# Patient Record
Sex: MALE | Race: BLACK OR AFRICAN AMERICAN | NOT HISPANIC OR LATINO | Employment: FULL TIME | URBAN - METROPOLITAN AREA
[De-identification: names, ages, dates, MRNs, and addresses within clinical notes are randomized per-mention and may not be internally consistent; named-entity substitution may affect disease eponyms.]

---

## 2021-07-11 ENCOUNTER — HOSPITAL ENCOUNTER (EMERGENCY)
Dept: RADIOLOGY | Facility: CLINIC | Age: 36
End: 2021-07-11
Payer: MEDICAID

## 2021-07-11 ENCOUNTER — HOSPITAL ENCOUNTER (EMERGENCY)
Facility: CLINIC | Age: 36
Discharge: HOME | End: 2021-07-11
Admitting: PHYSICIAN ASSISTANT
Payer: MEDICAID

## 2021-07-11 VITALS
HEIGHT: 72 IN | RESPIRATION RATE: 33 BRPM | DIASTOLIC BLOOD PRESSURE: 101 MMHG | TEMPERATURE: 97.8 F | OXYGEN SATURATION: 97 % | SYSTOLIC BLOOD PRESSURE: 177 MMHG | WEIGHT: 250 LBS | HEART RATE: 94 BPM | BODY MASS INDEX: 33.86 KG/M2

## 2021-07-11 DIAGNOSIS — I50.9 CONGESTIVE HEART FAILURE, UNSPECIFIED HF CHRONICITY, UNSPECIFIED HEART FAILURE TYPE (H): ICD-10-CM

## 2021-07-11 LAB
ALBUMIN SERPL-MCNC: 3.8 G/DL (ref 3.5–5)
ALP SERPL-CCNC: 56 U/L (ref 45–120)
ALT SERPL W P-5'-P-CCNC: 33 U/L (ref 0–45)
ANION GAP SERPL CALCULATED.3IONS-SCNC: 10 MMOL/L (ref 5–18)
AST SERPL W P-5'-P-CCNC: 36 U/L (ref 0–40)
BASOPHILS # BLD AUTO: 0.1 10E3/UL (ref 0–0.2)
BASOPHILS NFR BLD AUTO: 1 %
BILIRUB SERPL-MCNC: 0.4 MG/DL (ref 0–1)
BNP SERPL-MCNC: 579 PG/ML (ref 0–35)
BUN SERPL-MCNC: 17 MG/DL (ref 8–22)
CALCIUM SERPL-MCNC: 9.2 MG/DL (ref 8.5–10.5)
CHLORIDE BLD-SCNC: 109 MMOL/L (ref 98–107)
CO2 SERPL-SCNC: 21 MMOL/L (ref 22–31)
CREAT SERPL-MCNC: 1.29 MG/DL (ref 0.7–1.3)
EOSINOPHIL # BLD AUTO: 0.1 10E3/UL (ref 0–0.7)
EOSINOPHIL NFR BLD AUTO: 2 %
ERYTHROCYTE [DISTWIDTH] IN BLOOD BY AUTOMATED COUNT: 14.6 % (ref 10–15)
GFR SERPL CREATININE-BSD FRML MDRD: 71 ML/MIN/1.73M2
GLUCOSE BLD-MCNC: 95 MG/DL (ref 70–125)
HCT VFR BLD AUTO: 39.7 % (ref 40–53)
HGB BLD-MCNC: 13.1 G/DL (ref 13.3–17.7)
HOLD SPECIMEN: NORMAL
IMM GRANULOCYTES # BLD: 0 10E3/UL
IMM GRANULOCYTES NFR BLD: 0 %
LIPASE SERPL-CCNC: 17 U/L (ref 0–52)
LYMPHOCYTES # BLD AUTO: 2.6 10E3/UL (ref 0.8–5.3)
LYMPHOCYTES NFR BLD AUTO: 47 %
MCH RBC QN AUTO: 29.2 PG (ref 26.5–33)
MCHC RBC AUTO-ENTMCNC: 33 G/DL (ref 31.5–36.5)
MCV RBC AUTO: 88 FL (ref 78–100)
MONOCYTES # BLD AUTO: 0.2 10E3/UL (ref 0–1.3)
MONOCYTES NFR BLD AUTO: 4 %
NEUTROPHILS # BLD AUTO: 2.6 10E3/UL (ref 1.6–8.3)
NEUTROPHILS NFR BLD AUTO: 46 %
NRBC # BLD AUTO: 0 10E3/UL
NRBC BLD AUTO-RTO: 0 /100
PLATELET # BLD AUTO: 158 10E3/UL (ref 150–450)
POTASSIUM BLD-SCNC: 4.4 MMOL/L (ref 3.5–5)
PROT SERPL-MCNC: 7.2 G/DL (ref 6–8)
RBC # BLD AUTO: 4.49 10E6/UL (ref 4.4–5.9)
SODIUM SERPL-SCNC: 140 MMOL/L (ref 136–145)
TROPONIN I SERPL-MCNC: 0.1 NG/ML (ref 0–0.29)
WBC # BLD AUTO: 5.7 10E3/UL (ref 4–11)

## 2021-07-11 PROCEDURE — 84484 ASSAY OF TROPONIN QUANT: CPT | Performed by: PHYSICIAN ASSISTANT

## 2021-07-11 PROCEDURE — 93005 ELECTROCARDIOGRAM TRACING: CPT | Performed by: PHYSICIAN ASSISTANT

## 2021-07-11 PROCEDURE — 71046 X-RAY EXAM CHEST 2 VIEWS: CPT

## 2021-07-11 PROCEDURE — 99285 EMERGENCY DEPT VISIT HI MDM: CPT | Mod: 25

## 2021-07-11 PROCEDURE — 36592 COLLECT BLOOD FROM PICC: CPT | Performed by: PHYSICIAN ASSISTANT

## 2021-07-11 PROCEDURE — 80053 COMPREHEN METABOLIC PANEL: CPT | Performed by: PHYSICIAN ASSISTANT

## 2021-07-11 PROCEDURE — 96374 THER/PROPH/DIAG INJ IV PUSH: CPT

## 2021-07-11 PROCEDURE — 83690 ASSAY OF LIPASE: CPT | Performed by: PHYSICIAN ASSISTANT

## 2021-07-11 PROCEDURE — 83880 ASSAY OF NATRIURETIC PEPTIDE: CPT | Performed by: PHYSICIAN ASSISTANT

## 2021-07-11 PROCEDURE — 250N000011 HC RX IP 250 OP 636: Performed by: PHYSICIAN ASSISTANT

## 2021-07-11 PROCEDURE — 85025 COMPLETE CBC W/AUTO DIFF WBC: CPT | Performed by: PHYSICIAN ASSISTANT

## 2021-07-11 RX ORDER — LISINOPRIL 20 MG/1
20 TABLET ORAL DAILY
Qty: 30 TABLET | Refills: 3 | Status: ON HOLD | OUTPATIENT
Start: 2021-07-11 | End: 2021-12-27

## 2021-07-11 RX ORDER — FUROSEMIDE 10 MG/ML
20 INJECTION INTRAMUSCULAR; INTRAVENOUS ONCE
Status: COMPLETED | OUTPATIENT
Start: 2021-07-11 | End: 2021-07-11

## 2021-07-11 RX ORDER — SPIRONOLACTONE 25 MG/1
25 TABLET ORAL DAILY
Qty: 30 TABLET | Refills: 3 | Status: ON HOLD | OUTPATIENT
Start: 2021-07-11 | End: 2022-01-20

## 2021-07-11 RX ORDER — FUROSEMIDE 40 MG
40 TABLET ORAL DAILY
Qty: 30 TABLET | Refills: 0 | Status: ON HOLD | OUTPATIENT
Start: 2021-07-11 | End: 2021-12-27

## 2021-07-11 RX ORDER — CARVEDILOL 12.5 MG/1
12.5 TABLET ORAL 2 TIMES DAILY WITH MEALS
Qty: 60 TABLET | Refills: 0 | Status: SHIPPED | OUTPATIENT
Start: 2021-07-11 | End: 2022-01-19

## 2021-07-11 RX ADMIN — FUROSEMIDE 20 MG: 10 INJECTION, SOLUTION INTRAMUSCULAR; INTRAVENOUS at 23:20

## 2021-07-11 ASSESSMENT — ENCOUNTER SYMPTOMS
WOUND: 0
COUGH: 0
NAUSEA: 0
HEADACHES: 0
FREQUENCY: 0
BACK PAIN: 0
WEAKNESS: 0
HEMATURIA: 0
SORE THROAT: 0
EYE DISCHARGE: 0
NECK PAIN: 0
DYSURIA: 0
NUMBNESS: 0
ABDOMINAL DISTENTION: 1
DIARRHEA: 1
ABDOMINAL PAIN: 0
FEVER: 0
VOMITING: 0
CHEST TIGHTNESS: 1
SHORTNESS OF BREATH: 1
TROUBLE SWALLOWING: 0
CHILLS: 0

## 2021-07-11 ASSESSMENT — MIFFLIN-ST. JEOR: SCORE: 2101.99

## 2021-07-12 NOTE — ED PROVIDER NOTES
EMERGENCY DEPARTMENT ENCOUNTER      NAME: Cyrus Evans  AGE: 36 year old male  YOB: 1985  MRN: [id number]  EVALUATION DATE & TIME: 7/11/2021  8:38 PM    PCP: No primary care provider on file.    ED PROVIDER: Ayo Juan PA-C      Chief Complaint   Patient presents with     Shortness of Breath         FINAL IMPRESSION:  1. Congestive heart failure, unspecified HF chronicity, unspecified heart failure type (H)          MEDICAL DECISION MAKING:    Pertinent Labs & Imaging studies reviewed. (See chart for details)  36 year old male presents to the Emergency Department for evaluation of ongoing shortness of breath that has been progressive over the last few weeks.  This comes from gentleman with previous history of congestive heart failure who is supposed to be on 2 blood pressure medications and to diuretics but is been off of them for approximately 2 months.    Patient does not appear in acute respiratory distress, he does not appear toxic.  He is not hypoxic.  The screening x-ray and BNP do confirm mild CHF.  I have given a dose of IV Lasix here and I will refill all of his typical prescriptions that he has been using.  I also placed an outpatient referral for family practice and cardiology follow-up.  If he develops worsening or new symptoms he should return to the ED for repeat assessment otherwise patient is comfortable with discharging home and outpatient follow-up.        ED COURSE  8:44 PM  I met with the patient, obtained history, performed an initial exam, and discussed options and plan for diagnostics and treatment here in the ED.    At the conclusion of the encounter I discussed the results of all of the tests and the disposition. The questions were answered. The patient or family acknowledged understanding and was agreeable with the care plan.     MEDICATIONS GIVEN IN THE EMERGENCY:  Medications   furosemide (LASIX) injection 20 mg (20 mg Intravenous Given 7/11/21 2330)       NEW  PRESCRIPTIONS STARTED AT TODAY'S ER VISIT  New Prescriptions    CARVEDILOL (COREG) 12.5 MG TABLET    Take 1 tablet (12.5 mg) by mouth 2 times daily (with meals)    FUROSEMIDE (LASIX) 40 MG TABLET    Take 1 tablet (40 mg) by mouth daily    LISINOPRIL (ZESTRIL) 20 MG TABLET    Take 1 tablet (20 mg) by mouth daily    SPIRONOLACTONE (ALDACTONE) 25 MG TABLET    Take 1 tablet (25 mg) by mouth daily            =================================================================    HPI    Patient information was obtained from: patient    Use of Interpretor: N/A       Cyrus Evans is a 36 year old male with a pertinent history of HTN, CHF, alcohol abuse, current smoker who presents to this ED by walk in for evaluation of shortness of breath.    The patient states that he has had shortness of breath for the past month or so that worsened today. States it is worse with laying down flat and with activity, and at rest he feels okay. He gets some chest tightness when his shortness of breath gets bad, but otherwise denies chest pain. In addition he feels like his belly is bloated and swollen, and he has had diarrhea for about a week and a half. He denies any vomiting or any other symptoms. He reports smoking 0.5 pack of cigarettes/day. He reports taking lisinopril, Furosamide, spironolactone, and carvedilol. He denies history of DM or heart attacks.    His fiance states he that he recently moved here from [place] and has not set up with a PCP here yet. She states he's been off his medications for a month or two now. Patient confirms this.        REVIEW OF SYSTEMS   Review of Systems   Constitutional: Negative for chills and fever.   HENT: Negative for congestion, sore throat and trouble swallowing.    Eyes: Negative for discharge and visual disturbance.   Respiratory: Positive for chest tightness (with severe shortness of breath only) and shortness of breath. Negative for cough.    Cardiovascular: Negative for chest pain and  leg swelling.   Gastrointestinal: Positive for abdominal distention and diarrhea. Negative for abdominal pain, nausea and vomiting.   Genitourinary: Negative for dysuria, frequency, hematuria and urgency.   Musculoskeletal: Negative for back pain, gait problem and neck pain.   Skin: Negative for rash and wound.   Neurological: Negative for weakness, numbness and headaches.   Psychiatric/Behavioral: Negative for behavioral problems and suicidal ideas.          PAST MEDICAL HISTORY:  No past medical history on file.    PAST SURGICAL HISTORY:  No past surgical history on file.      CURRENT MEDICATIONS:    No current facility-administered medications for this encounter.    Current Outpatient Medications:      carvedilol (COREG) 12.5 MG tablet, Take 1 tablet (12.5 mg) by mouth 2 times daily (with meals), Disp: 60 tablet, Rfl: 0     furosemide (LASIX) 40 MG tablet, Take 1 tablet (40 mg) by mouth daily, Disp: 30 tablet, Rfl: 0     lisinopril (ZESTRIL) 20 MG tablet, Take 1 tablet (20 mg) by mouth daily, Disp: 30 tablet, Rfl: 3     spironolactone (ALDACTONE) 25 MG tablet, Take 1 tablet (25 mg) by mouth daily, Disp: 30 tablet, Rfl: 3     HYDROcodone-acetaminophen 5-325 mg per tablet, [HYDROCODONE-ACETAMINOPHEN 5-325 MG PER TABLET] Take 1 tablet by mouth every 4 (four) hours as needed for pain., Disp: 12 tablet, Rfl: 0      ALLERGIES:  No Known Allergies    FAMILY HISTORY:  No family history on file.    SOCIAL HISTORY:   Social History     Socioeconomic History     Marital status: Single     Spouse name: Not on file     Number of children: Not on file     Years of education: Not on file     Highest education level: Not on file   Occupational History     Not on file   Tobacco Use     Smoking status: Former Smoker   Substance and Sexual Activity     Alcohol use: Not on file     Comment: Alcoholic Drinks/day: Former     Drug use: Not on file     Sexual activity: Not on file   Other Topics Concern     Not on file   Social History  Narrative     Not on file     Social Determinants of Health     Financial Resource Strain:      Difficulty of Paying Living Expenses:    Food Insecurity:      Worried About Running Out of Food in the Last Year:      Ran Out of Food in the Last Year:    Transportation Needs:      Lack of Transportation (Medical):      Lack of Transportation (Non-Medical):    Physical Activity:      Days of Exercise per Week:      Minutes of Exercise per Session:    Stress:      Feeling of Stress :    Social Connections:      Frequency of Communication with Friends and Family:      Frequency of Social Gatherings with Friends and Family:      Attends Hindu Services:      Active Member of Clubs or Organizations:      Attends Club or Organization Meetings:      Marital Status:    Intimate Partner Violence:      Fear of Current or Ex-Partner:      Emotionally Abused:      Physically Abused:      Sexually Abused:        VITALS:  Patient Vitals for the past 24 hrs:   BP Temp Temp src Pulse Resp SpO2 Height Weight   07/11/21 2300 (!) 177/101 -- -- 94 (!) 33 97 % -- --   07/11/21 2200 (!) 166/109 -- -- 95 11 95 % -- --   07/11/21 2145 -- -- -- 100 20 94 % -- --   07/11/21 2130 (!) 200/130 -- -- 94 (!) 31 97 % -- --   07/11/21 2100 (!) 172/114 -- -- 95 30 98 % -- --   07/11/21 2045 (!) 161/111 -- -- 99 -- 100 % -- --   07/11/21 2033 -- 97.8  F (36.6  C) Oral 93 22 100 % 1.829 m (6') 113.4 kg (250 lb)       PHYSICAL EXAM    Physical Exam  Vitals and nursing note reviewed.   Constitutional:       General: He is not in acute distress.     Appearance: Normal appearance. He is obese. He is not ill-appearing or toxic-appearing.   HENT:      Head: Normocephalic and atraumatic.      Right Ear: Tympanic membrane, ear canal and external ear normal.      Left Ear: Tympanic membrane, ear canal and external ear normal.      Nose: Nose normal.      Mouth/Throat:      Mouth: Mucous membranes are moist.      Pharynx: Oropharynx is clear. No oropharyngeal  exudate or posterior oropharyngeal erythema.   Eyes:      General: No scleral icterus.        Right eye: No discharge.         Left eye: No discharge.      Extraocular Movements: Extraocular movements intact.      Conjunctiva/sclera: Conjunctivae normal.   Cardiovascular:      Rate and Rhythm: Normal rate and regular rhythm.      Pulses: Normal pulses.      Heart sounds: Normal heart sounds. No murmur heard.     Pulmonary:      Effort: Pulmonary effort is normal. No respiratory distress.      Breath sounds: Normal breath sounds. No stridor. No wheezing, rhonchi or rales.   Chest:      Chest wall: No tenderness.   Abdominal:      General: Abdomen is flat. Bowel sounds are normal. There is no distension.      Palpations: Abdomen is soft.      Tenderness: There is no abdominal tenderness. There is no guarding or rebound.   Musculoskeletal:         General: No swelling, tenderness, deformity or signs of injury. Normal range of motion.      Cervical back: Normal range of motion and neck supple. No rigidity or tenderness.      Right lower leg: No tenderness. Edema present.      Left lower leg: No tenderness. Edema present.   Lymphadenopathy:      Cervical: No cervical adenopathy.   Skin:     General: Skin is warm and dry.      Coloration: Skin is not jaundiced.      Findings: No bruising, erythema or rash.   Neurological:      General: No focal deficit present.      Mental Status: He is alert and oriented to person, place, and time. Mental status is at baseline.      Cranial Nerves: No cranial nerve deficit.      Sensory: No sensory deficit.      Motor: No weakness.      Gait: Gait normal.   Psychiatric:         Mood and Affect: Mood normal.         Behavior: Behavior normal.         Judgment: Judgment normal.          LAB:  All pertinent labs reviewed and interpreted.  Results for orders placed or performed during the hospital encounter of 07/11/21   XR Chest 2 Views    Impression    IMPRESSION: The cardiac silhouette is  enlarged. There is mild prominence of the central and basilar interstitium which could reflect mild fluid overload. No visible pneumothorax or pleural effusion.   Comprehensive metabolic panel   Result Value Ref Range    Sodium 140 136 - 145 mmol/L    Potassium 4.4 3.5 - 5.0 mmol/L    Chloride 109 (H) 98 - 107 mmol/L    Carbon Dioxide (CO2) 21 (L) 22 - 31 mmol/L    Anion Gap 10 5 - 18 mmol/L    Urea Nitrogen 17 8 - 22 mg/dL    Creatinine 1.29 0.70 - 1.30 mg/dL    Calcium 9.2 8.5 - 10.5 mg/dL    Glucose 95 70 - 125 mg/dL    Alkaline Phosphatase 56 45 - 120 U/L    AST 36 0 - 40 U/L    ALT 33 0 - 45 U/L    Protein Total 7.2 6.0 - 8.0 g/dL    Albumin 3.8 3.5 - 5.0 g/dL    Bilirubin Total 0.4 0.0 - 1.0 mg/dL    GFR Estimate 71 >60 mL/min/1.73m2   Result Value Ref Range    Lipase 17 0 - 52 U/L   Result Value Ref Range    Troponin I 0.10 0.00 - 0.29 ng/mL   B-Type Natriuretic Peptide ([facility] Only)   Result Value Ref Range     (H) 0 - 35 pg/mL   CBC with platelets and differential   Result Value Ref Range    WBC Count 5.7 4.0 - 11.0 10e3/uL    RBC Count 4.49 4.40 - 5.90 10e6/uL    Hemoglobin 13.1 (L) 13.3 - 17.7 g/dL    Hematocrit 39.7 (L) 40.0 - 53.0 %    MCV 88 78 - 100 fL    MCH 29.2 26.5 - 33.0 pg    MCHC 33.0 31.5 - 36.5 g/dL    RDW 14.6 10.0 - 15.0 %    Platelet Count 158 150 - 450 10e3/uL    % Neutrophils 46 %    % Lymphocytes 47 %    % Monocytes 4 %    % Eosinophils 2 %    % Basophils 1 %    % Immature Granulocytes 0 %    NRBCs per 100 WBC 0 <1 /100    Absolute Neutrophils 2.6 1.6 - 8.3 10e3/uL    Absolute Lymphocytes 2.6 0.8 - 5.3 10e3/uL    Absolute Monocytes 0.2 0.0 - 1.3 10e3/uL    Absolute Eosinophils 0.1 0.0 - 0.7 10e3/uL    Absolute Basophils 0.1 0.0 - 0.2 10e3/uL    Absolute Immature Granulocytes 0.0 <=0.0 10e3/uL    Absolute NRBCs 0.0 10e3/uL   Extra Purple Top Tube   Result Value Ref Range    Hold Specimen [facility]        RADIOLOGY:  Reviewed all pertinent imaging. Please see official radiology  report.  XR Chest 2 Views   Final Result   IMPRESSION: The cardiac silhouette is enlarged. There is mild prominence of the central and basilar interstitium which could reflect mild fluid overload. No visible pneumothorax or pleural effusion.          EKG:    Performed at: 2056    EKG showing sinus rhythm at a rate of 89 bpm.  ST segments are grossly normal with no obvious elevation or depression.  Biphasic T waves in leads V5 and V6 otherwise T waves are upright.  QTC measured at 498.  No previous EKG for comparison at this time.  No current complaints of chest pain, patient is only short of breath.    I have independently reviewed and interpreted the EKG(s) documented above.            I, Atif Whitley, am serving as a scribe to document services personally performed by Ayo Juan PA-C based on my observation and the provider's statements to me. I, Ayo Juan PA-C attest that Atif Whitley is acting in a scribe capacity, has observed my performance of the services and has documented them in accordance with my direction.    Ayo Juan PA-C  Emergency Medicine  [facility]     Ayo Juan PA-C  07/11/21 4266

## 2021-07-12 NOTE — DISCHARGE INSTRUCTIONS
Please continue to take your medications as prescribed.  I have placed referrals as an outpatient to establish family practice as an outpatient as well as outpatient cardiology.  If you have worsening symptoms consider returning to the emergency department.  I do believe that all of your symptoms will resolve once you resume your normal medications that you have been off of for the last couple of months.

## 2021-07-12 NOTE — ED TRIAGE NOTES
"Presents with complaint of shortness of breath and \"bloating\" for the past week. This worsens with laying flat and activity. Hx of CHF and states feels like an exacerbation.  "

## 2021-07-13 LAB
ATRIAL RATE - MUSE: 89 BPM
DIASTOLIC BLOOD PRESSURE - MUSE: 111 MMHG
INTERPRETATION ECG - MUSE: NORMAL
P AXIS - MUSE: 39 DEGREES
PR INTERVAL - MUSE: 162 MS
QRS DURATION - MUSE: 112 MS
QT - MUSE: 410 MS
QTC - MUSE: 498 MS
R AXIS - MUSE: -57 DEGREES
SYSTOLIC BLOOD PRESSURE - MUSE: 161 MMHG
T AXIS - MUSE: 80 DEGREES
VENTRICULAR RATE- MUSE: 89 BPM

## 2021-12-26 ENCOUNTER — APPOINTMENT (OUTPATIENT)
Dept: CT IMAGING | Facility: CLINIC | Age: 36
End: 2021-12-26

## 2021-12-26 ENCOUNTER — HOSPITAL ENCOUNTER (OUTPATIENT)
Facility: CLINIC | Age: 36
Setting detail: OBSERVATION
Discharge: HOME | End: 2021-12-27
Attending: EMERGENCY MEDICINE | Admitting: INTERNAL MEDICINE

## 2021-12-26 DIAGNOSIS — I10 ACCELERATED ESSENTIAL HYPERTENSION: ICD-10-CM

## 2021-12-26 DIAGNOSIS — I50.23 ACUTE ON CHRONIC SYSTOLIC CONGESTIVE HEART FAILURE (H): Primary | ICD-10-CM

## 2021-12-26 DIAGNOSIS — I50.9 ACUTE ON CHRONIC CONGESTIVE HEART FAILURE, UNSPECIFIED HEART FAILURE TYPE (H): ICD-10-CM

## 2021-12-26 PROBLEM — R79.89 ELEVATED TROPONIN: Status: ACTIVE | Noted: 2021-11-20

## 2021-12-26 PROBLEM — Z87.891 FORMER CIGARETTE SMOKER: Status: ACTIVE | Noted: 2021-12-26

## 2021-12-26 PROBLEM — I50.20 HFREF (HEART FAILURE WITH REDUCED EJECTION FRACTION) (H): Status: ACTIVE | Noted: 2021-11-20

## 2021-12-26 PROBLEM — N18.30 CKD (CHRONIC KIDNEY DISEASE), STAGE III (H): Status: ACTIVE | Noted: 2021-11-20

## 2021-12-26 PROBLEM — E66.811 OBESITY (BMI 30.0-34.9): Status: ACTIVE | Noted: 2021-12-26

## 2021-12-26 PROBLEM — F10.10 ALCOHOL ABUSE: Status: ACTIVE | Noted: 2018-04-03

## 2021-12-26 PROBLEM — R79.89 SERUM CREATININE RAISED: Status: ACTIVE | Noted: 2018-04-11

## 2021-12-26 LAB
ALBUMIN SERPL-MCNC: 3.4 G/DL (ref 3.5–5)
ALP SERPL-CCNC: 72 U/L (ref 45–120)
ALT SERPL W P-5'-P-CCNC: 30 U/L (ref 0–45)
ANION GAP SERPL CALCULATED.3IONS-SCNC: 7 MMOL/L (ref 5–18)
AST SERPL W P-5'-P-CCNC: 23 U/L (ref 0–40)
BASOPHILS # BLD AUTO: 0 10E3/UL (ref 0–0.2)
BASOPHILS NFR BLD AUTO: 1 %
BILIRUB SERPL-MCNC: 0.5 MG/DL (ref 0–1)
BNP SERPL-MCNC: 2065 PG/ML (ref 0–35)
BUN SERPL-MCNC: 18 MG/DL (ref 8–22)
CALCIUM SERPL-MCNC: 8.8 MG/DL (ref 8.5–10.5)
CHLORIDE BLD-SCNC: 105 MMOL/L (ref 98–107)
CO2 SERPL-SCNC: 26 MMOL/L (ref 22–31)
CREAT SERPL-MCNC: 1.23 MG/DL (ref 0.7–1.3)
D DIMER PPP FEU-MCNC: 3.77 UG/ML FEU (ref 0–0.5)
EOSINOPHIL # BLD AUTO: 0.1 10E3/UL (ref 0–0.7)
EOSINOPHIL NFR BLD AUTO: 1 %
ERYTHROCYTE [DISTWIDTH] IN BLOOD BY AUTOMATED COUNT: 14.1 % (ref 10–15)
FLUAV RNA SPEC QL NAA+PROBE: NEGATIVE
FLUBV RNA RESP QL NAA+PROBE: NEGATIVE
GFR SERPL CREATININE-BSD FRML MDRD: 78 ML/MIN/1.73M2
GLUCOSE BLD-MCNC: 109 MG/DL (ref 70–125)
HCT VFR BLD AUTO: 41.7 % (ref 40–53)
HGB BLD-MCNC: 13.6 G/DL (ref 13.3–17.7)
IMM GRANULOCYTES # BLD: 0 10E3/UL
IMM GRANULOCYTES NFR BLD: 0 %
LYMPHOCYTES # BLD AUTO: 2.2 10E3/UL (ref 0.8–5.3)
LYMPHOCYTES NFR BLD AUTO: 44 %
MAGNESIUM SERPL-MCNC: 1.8 MG/DL (ref 1.8–2.6)
MAGNESIUM SERPL-MCNC: 1.9 MG/DL (ref 1.8–2.6)
MCH RBC QN AUTO: 29.7 PG (ref 26.5–33)
MCHC RBC AUTO-ENTMCNC: 32.6 G/DL (ref 31.5–36.5)
MCV RBC AUTO: 91 FL (ref 78–100)
MONOCYTES # BLD AUTO: 0.2 10E3/UL (ref 0–1.3)
MONOCYTES NFR BLD AUTO: 5 %
NEUTROPHILS # BLD AUTO: 2.5 10E3/UL (ref 1.6–8.3)
NEUTROPHILS NFR BLD AUTO: 49 %
NRBC # BLD AUTO: 0 10E3/UL
NRBC BLD AUTO-RTO: 0 /100
PLATELET # BLD AUTO: 176 10E3/UL (ref 150–450)
POTASSIUM BLD-SCNC: 4.1 MMOL/L (ref 3.5–5)
POTASSIUM BLD-SCNC: 4.3 MMOL/L (ref 3.5–5)
PROT SERPL-MCNC: 6.8 G/DL (ref 6–8)
RBC # BLD AUTO: 4.58 10E6/UL (ref 4.4–5.9)
SARS-COV-2 RNA RESP QL NAA+PROBE: NEGATIVE
SODIUM SERPL-SCNC: 138 MMOL/L (ref 136–145)
TROPONIN I SERPL-MCNC: 0.07 NG/ML (ref 0–0.29)
WBC # BLD AUTO: 5 10E3/UL (ref 4–11)

## 2021-12-26 PROCEDURE — G0378 HOSPITAL OBSERVATION PER HR: HCPCS

## 2021-12-26 PROCEDURE — 250N000011 HC RX IP 250 OP 636: Performed by: INTERNAL MEDICINE

## 2021-12-26 PROCEDURE — 85379 FIBRIN DEGRADATION QUANT: CPT | Performed by: PHYSICIAN ASSISTANT

## 2021-12-26 PROCEDURE — 99285 EMERGENCY DEPT VISIT HI MDM: CPT | Mod: 25

## 2021-12-26 PROCEDURE — 36415 COLL VENOUS BLD VENIPUNCTURE: CPT | Performed by: INTERNAL MEDICINE

## 2021-12-26 PROCEDURE — 250N000011 HC RX IP 250 OP 636: Performed by: PHYSICIAN ASSISTANT

## 2021-12-26 PROCEDURE — C9803 HOPD COVID-19 SPEC COLLECT: HCPCS

## 2021-12-26 PROCEDURE — 83735 ASSAY OF MAGNESIUM: CPT | Performed by: PHYSICIAN ASSISTANT

## 2021-12-26 PROCEDURE — 96376 TX/PRO/DX INJ SAME DRUG ADON: CPT

## 2021-12-26 PROCEDURE — 84132 ASSAY OF SERUM POTASSIUM: CPT | Performed by: INTERNAL MEDICINE

## 2021-12-26 PROCEDURE — 250N000013 HC RX MED GY IP 250 OP 250 PS 637: Performed by: INTERNAL MEDICINE

## 2021-12-26 PROCEDURE — 36415 COLL VENOUS BLD VENIPUNCTURE: CPT | Performed by: PHYSICIAN ASSISTANT

## 2021-12-26 PROCEDURE — 96375 TX/PRO/DX INJ NEW DRUG ADDON: CPT

## 2021-12-26 PROCEDURE — 83880 ASSAY OF NATRIURETIC PEPTIDE: CPT | Performed by: PHYSICIAN ASSISTANT

## 2021-12-26 PROCEDURE — 96374 THER/PROPH/DIAG INJ IV PUSH: CPT

## 2021-12-26 PROCEDURE — 83735 ASSAY OF MAGNESIUM: CPT | Performed by: INTERNAL MEDICINE

## 2021-12-26 PROCEDURE — 93005 ELECTROCARDIOGRAM TRACING: CPT | Performed by: EMERGENCY MEDICINE

## 2021-12-26 PROCEDURE — 99220 PR INITIAL OBSERVATION CARE,LEVEL III: CPT | Performed by: INTERNAL MEDICINE

## 2021-12-26 PROCEDURE — 80053 COMPREHEN METABOLIC PANEL: CPT | Performed by: PHYSICIAN ASSISTANT

## 2021-12-26 PROCEDURE — 71275 CT ANGIOGRAPHY CHEST: CPT

## 2021-12-26 PROCEDURE — 87636 SARSCOV2 & INF A&B AMP PRB: CPT | Performed by: PHYSICIAN ASSISTANT

## 2021-12-26 PROCEDURE — 84484 ASSAY OF TROPONIN QUANT: CPT | Performed by: PHYSICIAN ASSISTANT

## 2021-12-26 PROCEDURE — 85025 COMPLETE CBC W/AUTO DIFF WBC: CPT | Performed by: PHYSICIAN ASSISTANT

## 2021-12-26 PROCEDURE — 250N000013 HC RX MED GY IP 250 OP 250 PS 637: Performed by: PHYSICIAN ASSISTANT

## 2021-12-26 RX ORDER — ACETAMINOPHEN 650 MG/1
650 SUPPOSITORY RECTAL EVERY 6 HOURS PRN
Status: DISCONTINUED | OUTPATIENT
Start: 2021-12-26 | End: 2021-12-27 | Stop reason: HOSPADM

## 2021-12-26 RX ORDER — NITROGLYCERIN 0.4 MG/1
0.4 TABLET SUBLINGUAL EVERY 5 MIN PRN
Status: DISCONTINUED | OUTPATIENT
Start: 2021-12-26 | End: 2021-12-27 | Stop reason: HOSPADM

## 2021-12-26 RX ORDER — HYDRALAZINE HYDROCHLORIDE 20 MG/ML
10 INJECTION INTRAMUSCULAR; INTRAVENOUS EVERY 4 HOURS PRN
Status: DISCONTINUED | OUTPATIENT
Start: 2021-12-26 | End: 2021-12-27 | Stop reason: HOSPADM

## 2021-12-26 RX ORDER — DIPHENHYDRAMINE HYDROCHLORIDE 50 MG/ML
25 INJECTION INTRAMUSCULAR; INTRAVENOUS ONCE
Status: COMPLETED | OUTPATIENT
Start: 2021-12-26 | End: 2021-12-26

## 2021-12-26 RX ORDER — FUROSEMIDE 10 MG/ML
60 INJECTION INTRAMUSCULAR; INTRAVENOUS ONCE
Status: COMPLETED | OUTPATIENT
Start: 2021-12-26 | End: 2021-12-26

## 2021-12-26 RX ORDER — CARVEDILOL 12.5 MG/1
12.5 TABLET ORAL 2 TIMES DAILY WITH MEALS
Status: DISCONTINUED | OUTPATIENT
Start: 2021-12-26 | End: 2021-12-27 | Stop reason: HOSPADM

## 2021-12-26 RX ORDER — NICOTINE 21 MG/24HR
1 PATCH, TRANSDERMAL 24 HOURS TRANSDERMAL DAILY
Status: DISCONTINUED | OUTPATIENT
Start: 2021-12-26 | End: 2021-12-27 | Stop reason: HOSPADM

## 2021-12-26 RX ORDER — AMOXICILLIN 250 MG
2 CAPSULE ORAL 2 TIMES DAILY PRN
Status: DISCONTINUED | OUTPATIENT
Start: 2021-12-26 | End: 2021-12-27 | Stop reason: HOSPADM

## 2021-12-26 RX ORDER — ACETAMINOPHEN 325 MG/1
650 TABLET ORAL EVERY 6 HOURS PRN
Status: DISCONTINUED | OUTPATIENT
Start: 2021-12-26 | End: 2021-12-27 | Stop reason: HOSPADM

## 2021-12-26 RX ORDER — IOPAMIDOL 755 MG/ML
100 INJECTION, SOLUTION INTRAVASCULAR ONCE
Status: COMPLETED | OUTPATIENT
Start: 2021-12-26 | End: 2021-12-26

## 2021-12-26 RX ORDER — NICOTINE 21 MG/24HR
1 PATCH, TRANSDERMAL 24 HOURS TRANSDERMAL DAILY
Status: DISCONTINUED | OUTPATIENT
Start: 2021-12-27 | End: 2021-12-26

## 2021-12-26 RX ORDER — MAGNESIUM OXIDE 400 MG/1
400 TABLET ORAL 2 TIMES DAILY
Status: DISCONTINUED | OUTPATIENT
Start: 2021-12-26 | End: 2021-12-27 | Stop reason: HOSPADM

## 2021-12-26 RX ORDER — LISINOPRIL 20 MG/1
20 TABLET ORAL DAILY
Status: DISCONTINUED | OUTPATIENT
Start: 2021-12-27 | End: 2021-12-27

## 2021-12-26 RX ORDER — POLYETHYLENE GLYCOL 3350 17 G/17G
17 POWDER, FOR SOLUTION ORAL DAILY PRN
Status: DISCONTINUED | OUTPATIENT
Start: 2021-12-26 | End: 2021-12-27 | Stop reason: HOSPADM

## 2021-12-26 RX ORDER — LIDOCAINE 40 MG/G
CREAM TOPICAL
Status: DISCONTINUED | OUTPATIENT
Start: 2021-12-26 | End: 2021-12-27 | Stop reason: HOSPADM

## 2021-12-26 RX ORDER — AMOXICILLIN 250 MG
1 CAPSULE ORAL 2 TIMES DAILY PRN
Status: DISCONTINUED | OUTPATIENT
Start: 2021-12-26 | End: 2021-12-27 | Stop reason: HOSPADM

## 2021-12-26 RX ORDER — ONDANSETRON 4 MG/1
4 TABLET, ORALLY DISINTEGRATING ORAL EVERY 6 HOURS PRN
Status: DISCONTINUED | OUTPATIENT
Start: 2021-12-26 | End: 2021-12-27 | Stop reason: HOSPADM

## 2021-12-26 RX ORDER — ONDANSETRON 2 MG/ML
4 INJECTION INTRAMUSCULAR; INTRAVENOUS EVERY 6 HOURS PRN
Status: DISCONTINUED | OUTPATIENT
Start: 2021-12-26 | End: 2021-12-27 | Stop reason: HOSPADM

## 2021-12-26 RX ORDER — FUROSEMIDE 10 MG/ML
60 INJECTION INTRAMUSCULAR; INTRAVENOUS EVERY 8 HOURS
Status: DISCONTINUED | OUTPATIENT
Start: 2021-12-26 | End: 2021-12-27

## 2021-12-26 RX ADMIN — FUROSEMIDE 60 MG: 10 INJECTION, SOLUTION INTRAMUSCULAR; INTRAVENOUS at 20:57

## 2021-12-26 RX ADMIN — NICOTINE 1 PATCH: 14 PATCH, EXTENDED RELEASE TRANSDERMAL at 23:06

## 2021-12-26 RX ADMIN — IOPAMIDOL 100 ML: 755 INJECTION, SOLUTION INTRAVENOUS at 18:23

## 2021-12-26 RX ADMIN — CARVEDILOL 12.5 MG: 12.5 TABLET, FILM COATED ORAL at 23:05

## 2021-12-26 RX ADMIN — FUROSEMIDE 60 MG: 10 INJECTION, SOLUTION INTRAMUSCULAR; INTRAVENOUS at 19:10

## 2021-12-26 RX ADMIN — DIPHENHYDRAMINE HYDROCHLORIDE 25 MG: 50 INJECTION INTRAMUSCULAR; INTRAVENOUS at 18:39

## 2021-12-26 RX ADMIN — MAGNESIUM OXIDE TAB 400 MG (241.3 MG ELEMENTAL MG) 400 MG: 400 (241.3 MG) TAB at 23:40

## 2021-12-26 RX ADMIN — NITROGLYCERIN 0.4 MG: 0.4 TABLET SUBLINGUAL at 19:14

## 2021-12-26 ASSESSMENT — ENCOUNTER SYMPTOMS
NAUSEA: 0
HEMATURIA: 0
UNEXPECTED WEIGHT CHANGE: 0
DIFFICULTY URINATING: 0
SHORTNESS OF BREATH: 1
VOMITING: 0
BLOOD IN STOOL: 0
SORE THROAT: 0
DIZZINESS: 0
COUGH: 0
DYSURIA: 0
DIARRHEA: 0
FEVER: 0
CHILLS: 0
HEADACHES: 0
BRUISES/BLEEDS EASILY: 0
LIGHT-HEADEDNESS: 0
ABDOMINAL PAIN: 0
WEAKNESS: 0

## 2021-12-26 ASSESSMENT — MIFFLIN-ST. JEOR: SCORE: 2082.94

## 2021-12-26 NOTE — ED PROVIDER NOTES
EMERGENCY DEPARTMENT ENCOUNTER      NAME: Cyrus Evans  AGE: 36 year old male  YOB: 1985  MRN: [id number]  EVALUATION DATE & TIME: 12/26/2021  4:54 PM    PCP: No Ref-Primary, Physician    ED PROVIDER: Catrachita Serna PA-C      Chief Complaint   Patient presents with     Shortness of Breath         FINAL IMPRESSION:  1. Acute on chronic congestive heart failure, unspecified heart failure type (H)          MEDICAL DECISION MAKING:    Pertinent Labs & Imaging studies reviewed. (See chart for details)  36 year old male with a pertinent history of HTN, CHF, CKDIII, alcohol abuse presents to the Emergency Department for evaluation of progressively worsening shortness of breath worse with lying flat and walking. Occasional nonexertional chest pain. Admitted 1 month ago at [facility]. EF 15-20%, BNP 1205, trop elevated. Patient left the followed day AMA.     Vitals reviewed and notable for elevated blood pressure and tachypnea. Saturating mid 90s on room air. Speaking full sentences easily. Diminished breath sounds bilaterally. No wheezing or crackles. No cough. No peripheral edema. Symmetric peripheral pulses. Abdomen soft and non-tender. Differential diagnosis includes but not limited to ACS, PE, pleural effusion, CHF exacerbation, pneumonia, viral URI including COVID-19.     EKG with new PVCs and fusion complexes. No acute ST elevations or depressions. Troponin WNL. D-dimer 3.77. BNP 2065, baseline elevated however now worse. Proceeded with CTA. No evidence for pulmonary emboli. Improved bilateral groundglass opacities. Cardiomegaly with interstitial densities/edema both lungs, pleural effusions, and reflux of contrast into the IVC and hepatic veins indicating right heart failure. No leukocytosis or anemia. Renal function WNL. No electrolyte derangements. COVID and influenza negative. Patient given nitroglycerin SL and 60 mg IV lasix. Shortly after returning from CT he noted his lips felt itchy, benadryl given  with resolution of symptoms. No respiratory distress, angioedema or hives. Patient will be admitted to hospitalist for acute on chronic CHF exacerbation further evaluation and treatment.     0 minutes of critical care time     ED COURSE:  5:06 PM I met with the patient, wearing protective eyewear and N95 mask, obtained history, performed an initial exam, and discussed options and plan for diagnostics and treatment here in the ED.  6:24 PM I staffed patient with Dr. Waldron.  7:04 PM I spoke with Dr. Black, the Hospitalist who accepts patient for admission.     At the conclusion of the encounter I discussed the results of all of the tests and the indication for admission. The questions were answered. The patient acknowledged understanding and was agreeable with the care plan.     MEDICATIONS GIVEN IN THE EMERGENCY:  Medications   nitroGLYcerin (NITROSTAT) sublingual tablet 0.4 mg (0.4 mg Sublingual Given 12/26/21 1914)   carvedilol (COREG) tablet 12.5 mg (has no administration in time range)   lisinopril (ZESTRIL) tablet 20 mg (has no administration in time range)   lidocaine 1 % 0.1-1 mL (has no administration in time range)   lidocaine (LMX4) cream (has no administration in time range)   sodium chloride (PF) 0.9% PF flush 3 mL (has no administration in time range)   sodium chloride (PF) 0.9% PF flush 3 mL (has no administration in time range)   melatonin tablet 1 mg (has no administration in time range)   HOLD: nitroGLYcerin IF (has no administration in time range)   acetaminophen (TYLENOL) tablet 650 mg (has no administration in time range)     Or   acetaminophen (TYLENOL) Suppository 650 mg (has no administration in time range)   senna-docusate (SENOKOT-S/PERICOLACE) 8.6-50 MG per tablet 1 tablet (has no administration in time range)     Or   senna-docusate (SENOKOT-S/PERICOLACE) 8.6-50 MG per tablet 2 tablet (has no administration in time range)   polyethylene glycol (MIRALAX) Packet 17 g (has no administration  in time range)   ondansetron (ZOFRAN-ODT) ODT tab 4 mg (has no administration in time range)     Or   ondansetron (ZOFRAN) injection 4 mg (has no administration in time range)   nicotine Patch in Place (has no administration in time range)   nicotine (NICODERM CQ) 14 MG/24HR 24 hr patch 1 patch (has no administration in time range)   furosemide (LASIX) injection 60 mg (has no administration in time range)   hydrALAZINE (APRESOLINE) injection 10 mg (has no administration in time range)   iopamidol (ISOVUE-370) solution 100 mL (100 mLs Intravenous Given 12/26/21 1823)   diphenhydrAMINE (BENADRYL) injection 25 mg (25 mg Intravenous Given 12/26/21 1839)   furosemide (LASIX) injection 60 mg (60 mg Intravenous Given 12/26/21 1910)       NEW PRESCRIPTIONS STARTED AT TODAY'S ER VISIT  New Prescriptions    No medications on file            =================================================================    HPI:    Patient information was obtained from: patient    Use of Interpretor: N/A       Cyrus Evans is a 36 year old male with a pertinent history of HTN, CHF, CKDIII, alcohol abuse who presents to this ED from home for evaluation of shortness of breath.     Per EMR, patient was hospitalized at [facility] on 11/20 for chest pain. CTA with groundglass opacities in upper and lower lobes concerning for pneumonia, septal thickening/edema of upper lobes. Procalcitonin and WBC normal. COVID19 negx2. Troponins elevated at 0.117. BNP 1205. Pt was admitted to rule out AMI. Cardiology was consulted. Echo done on 11/21/21 that showed worsened EF of 15-20%. He left AMA via ambulatory accompanied by self without informing staff that he was leaving on 1655 on 11/21.    Patient reports progressively worsening shortness of breath since his last admission ~4 weeks ago. His shortness of breath worsens with laying down flat and exertion. He also has occasional left sided chest pain that is non-exertional. No known provoking  factors to his chest pain. Denies cough, fever, vomiting, diarrhea, leg swelling, weight gain or any other symptoms. He is compliant with all his medications and denies any recent med changes. He is not vaccinated against COVID-19. He is a smoker but is trying to cut back. Denies alcohol use, has been sober for 3 years.    REVIEW OF SYSTEMS:  Review of Systems   Constitutional: Negative for chills, fever and unexpected weight change.   HENT: Negative for congestion and sore throat.    Respiratory: Positive for shortness of breath. Negative for cough.    Cardiovascular: Positive for chest pain (non exertional). Negative for leg swelling.   Gastrointestinal: Negative for abdominal pain, blood in stool, diarrhea, nausea and vomiting.   Genitourinary: Negative for decreased urine volume, difficulty urinating, dysuria and hematuria.   Skin: Negative for rash.   Neurological: Negative for dizziness, weakness, light-headedness and headaches.   Hematological: Does not bruise/bleed easily.   All other systems reviewed and are negative.       PAST MEDICAL HISTORY:  History reviewed. No pertinent past medical history.    PAST SURGICAL HISTORY:  History reviewed. No pertinent surgical history.      CURRENT MEDICATIONS:      Current Facility-Administered Medications:      acetaminophen (TYLENOL) tablet 650 mg, 650 mg, Oral, Q6H PRN **OR** acetaminophen (TYLENOL) Suppository 650 mg, 650 mg, Rectal, Q6H PRN, Rock Black DO     carvedilol (COREG) tablet 12.5 mg, 12.5 mg, Oral, BID w/meals, Rock Black DO     furosemide (LASIX) injection 60 mg, 60 mg, Intravenous, Q8H, Rock Black DO     HOLD: nitroGLYcerin IF, , Does not apply, HOLD, Rock Black DO     hydrALAZINE (APRESOLINE) injection 10 mg, 10 mg, Intravenous, Q4H PRN, Rock Black DO     lidocaine (LMX4) cream, , Topical, Q1H PRN, oRck Black DO     lidocaine 1 % 0.1-1 mL, 0.1-1 mL, Other, Q1H PRN, Rock Black DO     [START  ON 12/27/2021] lisinopril (ZESTRIL) tablet 20 mg, 20 mg, Oral, Daily, Rock Black DO     melatonin tablet 1 mg, 1 mg, Oral, At Bedtime PRN, Rock Black DO     [START ON 12/27/2021] nicotine (NICODERM CQ) 14 MG/24HR 24 hr patch 1 patch, 1 patch, Transdermal, Daily, Rock Black DO     nicotine Patch in Place, , Transdermal, Q8H, Rock Black DO     nitroGLYcerin (NITROSTAT) sublingual tablet 0.4 mg, 0.4 mg, Sublingual, Q5 Min PRN, Catrachita Serna PA-C, 0.4 mg at 12/26/21 1914     ondansetron (ZOFRAN-ODT) ODT tab 4 mg, 4 mg, Oral, Q6H PRN **OR** ondansetron (ZOFRAN) injection 4 mg, 4 mg, Intravenous, Q6H PRN, Rock Black DO     polyethylene glycol (MIRALAX) Packet 17 g, 17 g, Oral, Daily PRN, Rock Black DO     senna-docusate (SENOKOT-S/PERICOLACE) 8.6-50 MG per tablet 1 tablet, 1 tablet, Oral, BID PRN **OR** senna-docusate (SENOKOT-S/PERICOLACE) 8.6-50 MG per tablet 2 tablet, 2 tablet, Oral, BID PRN, Rock Black DO     sodium chloride (PF) 0.9% PF flush 3 mL, 3 mL, Intracatheter, Q8H, Rock Black DO     sodium chloride (PF) 0.9% PF flush 3 mL, 3 mL, Intracatheter, q1 min prn, Rock Black DO    Current Outpatient Medications:      carvedilol (COREG) 12.5 MG tablet, Take 1 tablet (12.5 mg) by mouth 2 times daily (with meals), Disp: 60 tablet, Rfl: 0     furosemide (LASIX) 40 MG tablet, Take 1 tablet (40 mg) by mouth daily, Disp: 30 tablet, Rfl: 0     HYDROcodone-acetaminophen 5-325 mg per tablet, [HYDROCODONE-ACETAMINOPHEN 5-325 MG PER TABLET] Take 1 tablet by mouth every 4 (four) hours as needed for pain., Disp: 12 tablet, Rfl: 0     lisinopril (ZESTRIL) 20 MG tablet, Take 1 tablet (20 mg) by mouth daily, Disp: 30 tablet, Rfl: 3     spironolactone (ALDACTONE) 25 MG tablet, Take 1 tablet (25 mg) by mouth daily, Disp: 30 tablet, Rfl: 3      ALLERGIES:  No Known Allergies    FAMILY HISTORY:  History reviewed. No pertinent family history.    SOCIAL  HISTORY:   Social History     Socioeconomic History     Marital status: Single     Spouse name: Not on file     Number of children: Not on file     Years of education: Not on file     Highest education level: Not on file   Occupational History     Not on file   Tobacco Use     Smoking status:  Smoker     Smokeless tobacco: Not on file   Substance and Sexual Activity     Alcohol use: Not on file     Comment: Alcoholic Drinks/day: Former     Drug use: Not on file     Sexual activity: Not on file   Other Topics Concern     Not on file   Social History Narrative     Not on file     Social Determinants of Health     Financial Resource Strain: Not on file   Food Insecurity: Not on file   Transportation Needs: Not on file   Physical Activity: Not on file   Stress: Not on file   Social Connections: Not on file   Intimate Partner Violence: Not on file   Housing Stability: Not on file       VITALS:  Patient Vitals for the past 24 hrs:   BP Temp Pulse Resp SpO2 Weight   12/26/21 2025 (!) 160/88 -- 94 -- 98 % --   12/26/21 2024 -- -- -- (!) 34 -- --   12/26/21 1945 (!) 157/108 -- 91 (!) 35 98 % --   12/26/21 1915 (!) 167/122 -- 91 (!) 31 99 % --   12/26/21 1900 (!) 161/120 -- 88 (!) 31 94 % --   12/26/21 1830 (!) 176/123 -- 95 -- 100 % --   12/26/21 1800 (!) 174/126 -- 86 26 95 % --   12/26/21 1730 (!) 179/129 -- 90 -- 100 % --   12/26/21 1701 (!) 202/156 97.8  F (36.6  C) 94 30 90 % 111.1 kg (245 lb)       PHYSICAL EXAM   Constitutional: Well developed, obese. NAD  HENT: Normocephalic, Atraumatic, Bilateral external ears normal, Oropharynx normal, mucous membranes moist, Nose normal.   Neck: Normal range of motion, No tenderness, Supple, No stridor.  Eyes: Conjunctiva normal, No discharge.   Respiratory: Mild tachypnea. Diminished breath sounds at bases bilaterally. No wheezing, Speaks full sentences easily.   Cardiovascular: Normal heart rate, Regular rhythm, No murmurs, No rubs, No gallops. Chest wall nontender.  GI: Soft,  No tenderness, No masses, No flank tenderness. No rebound or guarding.  Musculoskeletal: 2+ DP pulses. No peripheral edema. No cyanosis, No clubbing. Good range of motion in all major joints.  Integument: Warm, Dry, No erythema, No rash. No petechiae.  Neurologic: Alert & oriented x 3, Normal motor function, Normal sensory function, No focal deficits noted. Normal gait.  Psychiatric: Affect normal, Judgment normal, Mood normal. Cooperative.    LAB:  All pertinent labs reviewed and interpreted.  Recent Results (from the past 24 hour(s))   Comprehensive metabolic panel    Collection Time: 12/26/21  5:34 PM   Result Value Ref Range    Sodium 138 136 - 145 mmol/L    Potassium 4.1 3.5 - 5.0 mmol/L    Chloride 105 98 - 107 mmol/L    Carbon Dioxide (CO2) 26 22 - 31 mmol/L    Anion Gap 7 5 - 18 mmol/L    Urea Nitrogen 18 8 - 22 mg/dL    Creatinine 1.23 0.70 - 1.30 mg/dL    Calcium 8.8 8.5 - 10.5 mg/dL    Glucose 109 70 - 125 mg/dL    Alkaline Phosphatase 72 45 - 120 U/L    AST 23 0 - 40 U/L    ALT 30 0 - 45 U/L    Protein Total 6.8 6.0 - 8.0 g/dL    Albumin 3.4 (L) 3.5 - 5.0 g/dL    Bilirubin Total 0.5 0.0 - 1.0 mg/dL    GFR Estimate 78 >60 mL/min/1.73m2   Troponin I (now)    Collection Time: 12/26/21  5:34 PM   Result Value Ref Range    Troponin I 0.07 0.00 - 0.29 ng/mL   D dimer quantitative    Collection Time: 12/26/21  5:34 PM   Result Value Ref Range    D-Dimer Quantitative 3.77 (H) 0.00 - 0.50 ug/mL FEU   Magnesium    Collection Time: 12/26/21  5:34 PM   Result Value Ref Range    Magnesium 1.8 1.8 - 2.6 mg/dL   B-Type Natriuretic Peptide ([facility] Only)    Collection Time: 12/26/21  5:34 PM   Result Value Ref Range    BNP 2,065 (H) 0 - 35 pg/mL   CBC with platelets and differential    Collection Time: 12/26/21  5:34 PM   Result Value Ref Range    WBC Count 5.0 4.0 - 11.0 10e3/uL    RBC Count 4.58 4.40 - 5.90 10e6/uL    Hemoglobin 13.6 13.3 - 17.7 g/dL    Hematocrit 41.7 40.0 - 53.0 %    MCV 91 78 - 100 fL    MCH 29.7  26.5 - 33.0 pg    MCHC 32.6 31.5 - 36.5 g/dL    RDW 14.1 10.0 - 15.0 %    Platelet Count 176 150 - 450 10e3/uL    % Neutrophils 49 %    % Lymphocytes 44 %    % Monocytes 5 %    % Eosinophils 1 %    % Basophils 1 %    % Immature Granulocytes 0 %    NRBCs per 100 WBC 0 <1 /100    Absolute Neutrophils 2.5 1.6 - 8.3 10e3/uL    Absolute Lymphocytes 2.2 0.8 - 5.3 10e3/uL    Absolute Monocytes 0.2 0.0 - 1.3 10e3/uL    Absolute Eosinophils 0.1 0.0 - 0.7 10e3/uL    Absolute Basophils 0.0 0.0 - 0.2 10e3/uL    Absolute Immature Granulocytes 0.0 <=0.4 10e3/uL    Absolute NRBCs 0.0 10e3/uL   Symptomatic; Yes; 12/19/2021 Influenza A/B & SARS-CoV2 (COVID-19) Virus PCR Multiplex Nasopharyngeal    Collection Time: 12/26/21  5:36 PM    Specimen: Nasopharyngeal; Swab   Result Value Ref Range    Influenza A PCR Negative Negative    Influenza B PCR Negative Negative    SARS CoV2 PCR Negative Negative         RADIOLOGY:  Reviewed all pertinent imaging. Please see official radiology report.  CT Chest Pulmonary Embolism w Contrast   Final Result   IMPRESSION:   1.  No evidence for pulmonary emboli.    2.  Improved bilateral groundglass opacities.   3.  Cardiomegaly with interstitial densities/edema both lungs, pleural effusions, and reflux of contrast into the IVC and hepatic veins indicating right heart failure.      Echocardiogram Limited    (Results Pending)       EKG:      Performed at: 1713    Impression: sinus rhythm with frequent PVCs and fusion complexes. Possible left atrial enlargement. Left anterior fascicular block. Nonspecific T wave abnormality. Prolonged QT.     Rate: 87  Rhythm: sinus  Axis: -76  NE Interval: 168  QRS Interval: 116  QTc Interval: 495  ST Changes: No acute ST elevations or depressions.   Comparison: 07/11/21 PVCs and fusion complexes now present. T wave no longer inverted in lateral leads.     I have independently reviewed and interpreted the EKG(s) documented above.  Reviewed with Afshan Ahumada  Behmanesh , am serving as a scribe to document services personally performed by Catrachita Serna PA-C based on my observation and the provider's statements to me. I, Catrachita Serna PA-C attest that Sara Behmanesh is acting in a scribe capacity, has observed my performance of the services and has documented them in accordance with my direction.    Catrachita Serna PA-C  Emergency Medicine  [facility]  12/26/2021       Catrachita Serna PA-C  12/26/21 2059

## 2021-12-27 ENCOUNTER — APPOINTMENT (OUTPATIENT)
Dept: OCCUPATIONAL THERAPY | Facility: CLINIC | Age: 36
End: 2021-12-27
Attending: INTERNAL MEDICINE

## 2021-12-27 ENCOUNTER — APPOINTMENT (OUTPATIENT)
Dept: CARDIOLOGY | Facility: CLINIC | Age: 36
End: 2021-12-27
Attending: INTERNAL MEDICINE

## 2021-12-27 VITALS
HEIGHT: 72 IN | TEMPERATURE: 97.2 F | BODY MASS INDEX: 33.29 KG/M2 | RESPIRATION RATE: 16 BRPM | WEIGHT: 245.8 LBS | SYSTOLIC BLOOD PRESSURE: 135 MMHG | DIASTOLIC BLOOD PRESSURE: 98 MMHG | HEART RATE: 75 BPM | OXYGEN SATURATION: 98 %

## 2021-12-27 LAB
ALBUMIN SERPL-MCNC: 3.7 G/DL (ref 3.5–5)
ANION GAP SERPL CALCULATED.3IONS-SCNC: 11 MMOL/L (ref 5–18)
BUN SERPL-MCNC: 19 MG/DL (ref 8–22)
CALCIUM SERPL-MCNC: 9.5 MG/DL (ref 8.5–10.5)
CHLORIDE BLD-SCNC: 105 MMOL/L (ref 98–107)
CO2 SERPL-SCNC: 24 MMOL/L (ref 22–31)
CREAT SERPL-MCNC: 1.41 MG/DL (ref 0.7–1.3)
ERYTHROCYTE [DISTWIDTH] IN BLOOD BY AUTOMATED COUNT: 14 % (ref 10–15)
GFR SERPL CREATININE-BSD FRML MDRD: 66 ML/MIN/1.73M2
GLUCOSE BLD-MCNC: 115 MG/DL (ref 70–125)
HCT VFR BLD AUTO: 45.1 % (ref 40–53)
HGB BLD-MCNC: 15.1 G/DL (ref 13.3–17.7)
LVEF ECHO: NORMAL
MAGNESIUM SERPL-MCNC: 2 MG/DL (ref 1.8–2.6)
MCH RBC QN AUTO: 30.8 PG (ref 26.5–33)
MCHC RBC AUTO-ENTMCNC: 33.5 G/DL (ref 31.5–36.5)
MCV RBC AUTO: 92 FL (ref 78–100)
PHOSPHATE SERPL-MCNC: 4.2 MG/DL (ref 2.5–4.5)
PLATELET # BLD AUTO: 185 10E3/UL (ref 150–450)
POTASSIUM BLD-SCNC: 4 MMOL/L (ref 3.5–5)
RBC # BLD AUTO: 4.9 10E6/UL (ref 4.4–5.9)
SODIUM SERPL-SCNC: 140 MMOL/L (ref 136–145)
WBC # BLD AUTO: 5.7 10E3/UL (ref 4–11)

## 2021-12-27 PROCEDURE — 250N000013 HC RX MED GY IP 250 OP 250 PS 637: Performed by: INTERNAL MEDICINE

## 2021-12-27 PROCEDURE — 93321 DOPPLER ECHO F-UP/LMTD STD: CPT

## 2021-12-27 PROCEDURE — 85027 COMPLETE CBC AUTOMATED: CPT | Performed by: INTERNAL MEDICINE

## 2021-12-27 PROCEDURE — 999N000208 ECHOCARDIOGRAM LIMITED

## 2021-12-27 PROCEDURE — 96376 TX/PRO/DX INJ SAME DRUG ADON: CPT

## 2021-12-27 PROCEDURE — 97165 OT EVAL LOW COMPLEX 30 MIN: CPT | Mod: GO

## 2021-12-27 PROCEDURE — 99204 OFFICE O/P NEW MOD 45 MIN: CPT | Mod: 25 | Performed by: INTERNAL MEDICINE

## 2021-12-27 PROCEDURE — 99217 PR OBSERVATION CARE DISCHARGE: CPT | Performed by: INTERNAL MEDICINE

## 2021-12-27 PROCEDURE — G0378 HOSPITAL OBSERVATION PER HR: HCPCS

## 2021-12-27 PROCEDURE — 36415 COLL VENOUS BLD VENIPUNCTURE: CPT | Performed by: INTERNAL MEDICINE

## 2021-12-27 PROCEDURE — 93325 DOPPLER ECHO COLOR FLOW MAPG: CPT | Mod: 26 | Performed by: INTERNAL MEDICINE

## 2021-12-27 PROCEDURE — 93308 TTE F-UP OR LMTD: CPT | Mod: 26 | Performed by: INTERNAL MEDICINE

## 2021-12-27 PROCEDURE — 80069 RENAL FUNCTION PANEL: CPT | Performed by: INTERNAL MEDICINE

## 2021-12-27 PROCEDURE — 93321 DOPPLER ECHO F-UP/LMTD STD: CPT | Mod: 26 | Performed by: INTERNAL MEDICINE

## 2021-12-27 PROCEDURE — 250N000011 HC RX IP 250 OP 636: Performed by: INTERNAL MEDICINE

## 2021-12-27 PROCEDURE — 83735 ASSAY OF MAGNESIUM: CPT | Performed by: INTERNAL MEDICINE

## 2021-12-27 PROCEDURE — 97535 SELF CARE MNGMENT TRAINING: CPT | Mod: GO

## 2021-12-27 PROCEDURE — 255N000002 HC RX 255 OP 636: Performed by: INTERNAL MEDICINE

## 2021-12-27 RX ORDER — LISINOPRIL 20 MG/1
20 TABLET ORAL 2 TIMES DAILY
Qty: 60 TABLET | Refills: 0 | Status: SHIPPED | OUTPATIENT
Start: 2021-12-27

## 2021-12-27 RX ORDER — LISINOPRIL 20 MG/1
20 TABLET ORAL 2 TIMES DAILY
Status: DISCONTINUED | OUTPATIENT
Start: 2021-12-27 | End: 2021-12-27 | Stop reason: HOSPADM

## 2021-12-27 RX ORDER — FUROSEMIDE 40 MG
40 TABLET ORAL
Status: DISCONTINUED | OUTPATIENT
Start: 2021-12-27 | End: 2021-12-27 | Stop reason: HOSPADM

## 2021-12-27 RX ORDER — FUROSEMIDE 40 MG
40 TABLET ORAL
Qty: 60 TABLET | Refills: 0 | Status: SHIPPED | OUTPATIENT
Start: 2021-12-27

## 2021-12-27 RX ADMIN — LISINOPRIL 20 MG: 20 TABLET ORAL at 08:36

## 2021-12-27 RX ADMIN — MAGNESIUM OXIDE TAB 400 MG (241.3 MG ELEMENTAL MG) 400 MG: 400 (241.3 MG) TAB at 08:38

## 2021-12-27 RX ADMIN — FUROSEMIDE 40 MG: 40 TABLET ORAL at 10:00

## 2021-12-27 RX ADMIN — CARVEDILOL 12.5 MG: 12.5 TABLET, FILM COATED ORAL at 08:35

## 2021-12-27 RX ADMIN — PERFLUTREN 4 ML: 6.52 INJECTION, SUSPENSION INTRAVENOUS at 11:30

## 2021-12-27 RX ADMIN — NICOTINE 1 PATCH: 14 PATCH, EXTENDED RELEASE TRANSDERMAL at 08:38

## 2021-12-27 RX ADMIN — FUROSEMIDE 60 MG: 10 INJECTION, SOLUTION INTRAMUSCULAR; INTRAVENOUS at 04:20

## 2021-12-27 NOTE — ED PROVIDER NOTES
At this point I agree with the current assessment done in the Emergency Department. I, Tiffanie Waldron DO, have reviewed the documentation, personally taken the patient's history, performed an exam and agree with the physical finds, diagnosis and management plan.     I saw the patient with: Catrachita Serna PA-C     Cyrus Evans is a 36 year oldmale, with a history including HFrEF, HTN, CKD stage III, alcohol use disorder, and tobacco use disorder, who presents to the Emergency Department for the evaluation of shortness of breath.     Per chart review, patient was recently admitted to [facility] from 11/20-11/21/2021 for chest pain. CTA demonstrated groundglass opacities in upper and lower lobes concerning for pneumonia, along with septal thickening/edema within the upper lobes. Procalcitonin and WBC normal. COVID-19 negative x2. Serial Troponins peaked at 0.120. BNP 1205. EKG without ST elevation. Cardiology consulted and echo was performed on 11/21, demonstrating worsened EF of 15-20%. Pulmonology was also consulted and recommended a five day course of Levaquin 750 mg daily, bronchodilators, short course of steroids, and repeat chest CT in 6-8 weeks. Patient subsequently left A without informing staff that he was leaving.     Regarding his current presentation, patient has had progressively worsening dyspnea for the last week along with intermittent left sided chest pain. His dyspnea is exacerbated when laying flat. No identifiable provoking factors to his chest pain. Denies cough, fever, vomiting, diarrhea, leg swelling, or any other symptoms. He is complaint with all his medications. He is not vaccinated against COVID-19.       The creation of this record is based on the scribe s observations of the work being performed by Tiffanie Waldron DO and the provider s statements to them. It was created on his behalf by Shannon Avilez, a trained medical scribe. This document has been checked and approved by  the attending provider.      ROS:   Constitutional: Denies fever, chills, unintentional weight loss or fatigue   Respiratory: Endorses shortness of breath. Denies cough  Cardiovascular: Endorses chest pain. Denies palpitations or leg swelling  Neurologic: Denies current headache, new weakness, focal weakness, or sensory changes       Remainder of systems reviewed, unless noted in HPI all others negative.    Social: Smoker    Physical Exam:   BP (!) 160/88   Pulse 94   Temp 97.8  F (36.6  C)   Resp (!) 34   Wt 111.1 kg (245 lb)   SpO2 98%   BMI 33.23 kg/m    General presentation: Alert, Vital signs reviewed. No acute distress. Fatigued appearing.   Pulmonary: Currently in no acute respiratory distress. Bibasilar crackles noted. Slightly tachypneic.   Circulatory: Regular rate and rhythm. No murmurs, rubs, or gallops. Peripheral pulses are strong and equal in bilateral upper and lower extremities.     Results for orders placed or performed during the hospital encounter of 12/26/21   CT Chest Pulmonary Embolism w Contrast    Impression    IMPRESSION:  1.  No evidence for pulmonary emboli.   2.  Improved bilateral groundglass opacities.  3.  Cardiomegaly with interstitial densities/edema both lungs, pleural effusions, and reflux of contrast into the IVC and hepatic veins indicating right heart failure.   Comprehensive metabolic panel   Result Value Ref Range    Sodium 138 136 - 145 mmol/L    Potassium 4.1 3.5 - 5.0 mmol/L    Chloride 105 98 - 107 mmol/L    Carbon Dioxide (CO2) 26 22 - 31 mmol/L    Anion Gap 7 5 - 18 mmol/L    Urea Nitrogen 18 8 - 22 mg/dL    Creatinine 1.23 0.70 - 1.30 mg/dL    Calcium 8.8 8.5 - 10.5 mg/dL    Glucose 109 70 - 125 mg/dL    Alkaline Phosphatase 72 45 - 120 U/L    AST 23 0 - 40 U/L    ALT 30 0 - 45 U/L    Protein Total 6.8 6.0 - 8.0 g/dL    Albumin 3.4 (L) 3.5 - 5.0 g/dL    Bilirubin Total 0.5 0.0 - 1.0 mg/dL    GFR Estimate 78 >60 mL/min/1.73m2   Troponin I (now)   Result Value Ref  Range    Troponin I 0.07 0.00 - 0.29 ng/mL   D dimer quantitative   Result Value Ref Range    D-Dimer Quantitative 3.77 (H) 0.00 - 0.50 ug/mL FEU   Result Value Ref Range    Magnesium 1.8 1.8 - 2.6 mg/dL   Symptomatic; Yes; 12/19/2021 Influenza A/B & SARS-CoV2 (COVID-19) Virus PCR Multiplex Nasopharyngeal    Specimen: Nasopharyngeal; Swab   Result Value Ref Range    Influenza A PCR Negative Negative    Influenza B PCR Negative Negative    SARS CoV2 PCR Negative Negative   B-Type Natriuretic Peptide ([facility] Only)   Result Value Ref Range    BNP 2,065 (H) 0 - 35 pg/mL   CBC with platelets and differential   Result Value Ref Range    WBC Count 5.0 4.0 - 11.0 10e3/uL    RBC Count 4.58 4.40 - 5.90 10e6/uL    Hemoglobin 13.6 13.3 - 17.7 g/dL    Hematocrit 41.7 40.0 - 53.0 %    MCV 91 78 - 100 fL    MCH 29.7 26.5 - 33.0 pg    MCHC 32.6 31.5 - 36.5 g/dL    RDW 14.1 10.0 - 15.0 %    Platelet Count 176 150 - 450 10e3/uL    % Neutrophils 49 %    % Lymphocytes 44 %    % Monocytes 5 %    % Eosinophils 1 %    % Basophils 1 %    % Immature Granulocytes 0 %    NRBCs per 100 WBC 0 <1 /100    Absolute Neutrophils 2.5 1.6 - 8.3 10e3/uL    Absolute Lymphocytes 2.2 0.8 - 5.3 10e3/uL    Absolute Monocytes 0.2 0.0 - 1.3 10e3/uL    Absolute Eosinophils 0.1 0.0 - 0.7 10e3/uL    Absolute Basophils 0.0 0.0 - 0.2 10e3/uL    Absolute Immature Granulocytes 0.0 <=0.4 10e3/uL    Absolute NRBCs 0.0 10e3/uL     CT Chest Pulmonary Embolism w Contrast   Final Result   IMPRESSION:   1.  No evidence for pulmonary emboli.    2.  Improved bilateral groundglass opacities.   3.  Cardiomegaly with interstitial densities/edema both lungs, pleural effusions, and reflux of contrast into the IVC and hepatic veins indicating right heart failure.      Echocardiogram Limited    (Results Pending)       ED course:  6:25 PM I discussed the patient with Catrachita Serna PA-C, and agreed to evaluate him.   8:09 PM I introduced myself to the patient and discussed his lab,  EKG, and CT results.     MDM: 36-year-old male with a history of hypertension and congestive heart failure presented to the ED for evaluation of worsening orthopnea.  The patient denied any chest pain, lower extremity edema, or significant weight gain.  Upon arrival to the ED the patient was noted to be hypertensive with a blood pressure of 202/156.  Patient was also noted to have O2 sat of 90% on room air.  At the time of my evaluation the patient was noted to have tachypnea and bibasilar crackles.  He was not in acute respiratory stress, however.      Laboratory test show a BNP of 2065.  Most recent BMP for comparison in the [facility] system was 579 two months ago.  The troponin was unremarkable.  CBC and BMP were also unremarkable.  Covid test was negative.  The patient's D-dimer was positive at 3.7.  CT scan of the chest shows cardiomegaly and interstitial edema/densities bilaterally as well as evidence of reflux of the contrast into the IVC.    Patient was given IV Lasix as well as sublingual nitroglycerin to treat his acute on chronic congestive heart failure and markedly elevated diastolic blood pressures.  The patient was admitted to the hospital service for further evaluation and treatment.     IVanita Jaremy Dale, DO, personally performed the services described in this documentation, as scribed by Shannon Avilez in my presence, and it is both accurate and complete.      Tiffanie Waldron DO  12/26/21 4599

## 2021-12-27 NOTE — PLAN OF CARE
Problem: Adult Inpatient Plan of Care  Goal: Plan of Care Review  Outcome: Adequate for Discharge   Patient discharged today. PIV removed, telemetry removed. AVS completed, all questions answered. All belongings returned. Pt declined for writer to make PCP appointment, number was provided for the patient to call. Heart clinic appointment for Jan 4th.     Celia TRINH RN  12/27/21

## 2021-12-27 NOTE — CONSULTS
Care Management Discharge Note    Discharge Date: 12/28/2021       Discharge Disposition:  Home with Family      Discharge Transportation: Family to transport    Additional Information:  CM spoke with the Pt via phone. Pt declines CM needs at this time and states that he has what is needed at home.       LAUREN Maldonado

## 2021-12-27 NOTE — PROGRESS NOTES
"   12/27/21 0820   Quick Adds   Type of Visit Initial Occupational Therapy Evaluation   Living Environment   People in home other (see comments);significant other  (fiance and children)   Living Environment Comments pt has no concerns with anything at home and states he feels \"back to normal\".   Self-Care   Usual Activity Tolerance excellent   Current Activity Tolerance good   Regular Exercise Yes   Activity/Exercise Type   (\"concrete \" for work)   Equipment Currently Used at Home none  (walk-in shower)   Instrumental Activities of Daily Living (IADL)   IADL Comments indep with all I/ADL, includ concrete work   General Information   Onset of Illness/Injury or Date of Surgery 12/27/21   Referring Physician Dr. Rock Black   Patient/Family Therapy Goal Statement (OT) home with family   Additional Occupational Profile Info/Pertinent History of Current Problem low   Existing Precautions/Restrictions   (CHF)   Cognitive Status Examination   Orientation Status orientation to person, place and time   Affect/Mental Status (Cognitive) WNL   Visual Perception   Visual Impairment/Limitations WNL   Sensory   Sensory Quick Adds No deficits were identified   Pain Assessment   Patient Currently in Pain No   Range of Motion Comprehensive   General Range of Motion no range of motion deficits identified   Strength Comprehensive (MMT)   General Manual Muscle Testing (MMT) Assessment no strength deficits identified   Muscle Tone Assessment   Muscle Tone Quick Adds No deficits were identified   Coordination   Upper Extremity Coordination No deficits were identified   Bed Mobility   Bed Mobility No deficits identified   Transfers   Transfers No deficits identified   Balance   Balance Assessment no deficits were identified   Balance Comments able to stand/reach on one LE   Activities of Daily Living   BADL Assessment No deficits identified  (stated indep with dressing)   Clinical Impression   Criteria for Skilled Therapeutic " "Interventions Met (OT) yes   OT Problem List-Impairments impacting ADL activity tolerance impaired   Assessment of Occupational Performance 1-3 Performance Deficits   Identified Performance Deficits may have decr activity zohreh   Planned Therapy Interventions (OT) other (see comments)  (EC/WS)   Clinical Decision Making Complexity (OT) low complexity   Therapy Frequency (OT) Daily   Predicted Duration of Therapy 1 day   Risk & Benefits of therapy have been explained patient;care plan/treatment goals reviewed   Comment-Clinical Impression educ in PLB tech with handout and EC strats.   OT Discharge Planning    OT Discharge Recommendation (DC Rec) Home with assist   OT Rationale for DC Rec may need assist with IADL at first at home   Total Evaluation Time (Minutes)   Total Evaluation Time (Minutes) 5        12/27/21 0820   Quick Adds   Type of Visit Initial Occupational Therapy Evaluation   Living Environment   People in home other (see comments);significant other  (fiance and children)   Living Environment Comments pt has no concerns with anything at home and states he feels \"back to normal\".   Self-Care   Usual Activity Tolerance excellent   Current Activity Tolerance good   Regular Exercise Yes   Activity/Exercise Type   (\"concrete \" for work)   Equipment Currently Used at Home none  (walk-in shower)   Instrumental Activities of Daily Living (IADL)   IADL Comments indep with all I/ADL, includ concrete work   General Information   Onset of Illness/Injury or Date of Surgery 12/27/21   Referring Physician Dr. Rock Black   Patient/Family Therapy Goal Statement (OT) home with family   Additional Occupational Profile Info/Pertinent History of Current Problem low   Existing Precautions/Restrictions   (CHF)   Cognitive Status Examination   Orientation Status orientation to person, place and time   Affect/Mental Status (Cognitive) WNL   Visual Perception   Visual Impairment/Limitations WNL   Sensory   Sensory Quick " Adds No deficits were identified   Pain Assessment   Patient Currently in Pain No   Range of Motion Comprehensive   General Range of Motion no range of motion deficits identified   Strength Comprehensive (MMT)   General Manual Muscle Testing (MMT) Assessment no strength deficits identified   Muscle Tone Assessment   Muscle Tone Quick Adds No deficits were identified   Coordination   Upper Extremity Coordination No deficits were identified   Bed Mobility   Bed Mobility No deficits identified   Transfers   Transfers No deficits identified   Balance   Balance Assessment no deficits were identified   Balance Comments able to stand/reach on one LE   Activities of Daily Living   BADL Assessment No deficits identified  (stated indep with dressing)   Clinical Impression   Criteria for Skilled Therapeutic Interventions Met (OT) yes   OT Problem List-Impairments impacting ADL activity tolerance impaired   Assessment of Occupational Performance 1-3 Performance Deficits   Identified Performance Deficits may have decr activity zohreh   Planned Therapy Interventions (OT) other (see comments)  (EC/WS)   Clinical Decision Making Complexity (OT) low complexity   Therapy Frequency (OT) Daily   Predicted Duration of Therapy 1 day   Risk & Benefits of therapy have been explained patient;care plan/treatment goals reviewed   Comment-Clinical Impression educ in PLB tech with handout and EC strats.   OT Discharge Planning    OT Discharge Recommendation (DC Rec) Home with assist   OT Rationale for DC Rec may need assist with IADL at first at home   Total Evaluation Time (Minutes)   Total Evaluation Time (Minutes) 5

## 2021-12-27 NOTE — PROGRESS NOTES
Patient complaining of no chest pain or shortness of breath at this time. Patient had 800 in urinal plus voided another 400 for total of 1200. Patient also received turkey sandwich and some crackers to eat. Patient alert and oriented x 4. Patient has no other concerns at this time.

## 2021-12-27 NOTE — DISCHARGE SUMMARY
[facility] MEDICINE  DISCHARGE SUMMARY     Primary Care Physician: No Ref-Primary, Physician  Admission Date: 12/26/2021   Discharge Provider: Rock Black DO Discharge Date: 12/27/2021   Diet:   Active Diet and Nourishment Order   Procedures     2 Gram Sodium Diet     Diet       Code Status: Full Code   Activity: DCACTIVITY: Activity as tolerated        Condition at Discharge: Stable     REASON FOR PRESENTATION(See Admission Note for Details)   Shortness of breath, orthopnea.    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       PENDING LABS     Unresulted Labs Ordered in the Past 30 Days of this Admission     No orders found for last 31 day(s).        PROCEDURES ( this hospitalization only)        RECOMMENDATIONS TO OUTPATIENT PROVIDER FOR F/U VISIT     Follow-up Appointments     Add follow up information to the AVS prior to printing      Follow-up and recommended labs and tests       CHF/Core clinic follow up in 1 to 2 weeks. Patient to contact for follow   up.           DISPOSITION     Home    SUMMARY OF HOSPITAL COURSE:      36 year old old male with history of nonischemic cardiomyopathy diagnosed in April 2018 with initial left ventricular ejection fraction of 25%, essential hypertension, obesity, and noncompliance with outpatient medical management.   Patient admitted for symptoms of shortness of breath and orthopnea with suspicion of worsening systolic congestive heart failure/right heart failure.  BNP was elevated to 2065 on admission on 12/26/2021.  EKG and serial troponins were unremarkable.  Limited echocardiogram again revealed severe depression of left ventricular ejection fraction between 20  and 25% but otherwise unchanged.  Patient received IV diuresis with Lasix and resolution of presenting symptoms.  Cardiology did evaluate Cyrus and recommended importance of medication compliance.  Lisinopril and furosemide were increased to twice daily dosing.  Patient was continued on spironolactone at time of discharge.  Cardiology recommended consideration of initiation of Entresto in place of lisinopril if insurance will cover and will address as outpatient.  Patient will follow up with congestive heart failure clinic/core clinic in 1 to 2 weeks.  Patient encouraged to follow with primary care provider in 3 to 5 days.    Discharge Medications with Med changes:     Current Discharge Medication List      CONTINUE these medications which have CHANGED    Details   furosemide (LASIX) 40 MG tablet Take 1 tablet (40 mg) by mouth 2 times daily  Qty: 60 tablet, Refills: 0    Associated Diagnoses: Acute on chronic systolic congestive heart failure (H); Accelerated essential hypertension      lisinopril (ZESTRIL) 20 MG tablet Take 1 tablet (20 mg) by mouth 2 times daily  Qty: 60 tablet, Refills: 0    Associated Diagnoses: Acute on chronic systolic congestive heart failure (H); Accelerated essential hypertension         CONTINUE these medications which have NOT CHANGED    Details   carvedilol (COREG) 12.5 MG tablet Take 1 tablet (12.5 mg) by mouth 2 times daily (with meals)  Qty: 60 tablet, Refills: 0      spironolactone (ALDACTONE) 25 MG tablet Take 1 tablet (25 mg) by mouth daily  Qty: 30 tablet, Refills: 3           Rationale for medication changes:      Lisinopril and furosemide increased for symptomatic systolic congestive heart failure.        Consults   CORE CLINIC EVALUATION IP CONSULT  OCCUPATIONAL THERAPY ADULT IP CONSULT  NUTRITION SERVICES ADULT IP CONSULT  CARE MANAGEMENT / SOCIAL WORK IP CONSULT  CARDIOLOGY IP CONSULT    Immunizations given this encounter       There is no immunization history on file for  this patient.        Anticoagulation Information      Recent INR results: No results for input(s): INR in the last 168 hours.  Warfarin doses (if applicable) or name of other anticoagulant: N/A      SIGNIFICANT IMAGING FINDINGS     Results for orders placed or performed during the hospital encounter of 12/26/21   CT Chest Pulmonary Embolism w Contrast    Impression    IMPRESSION:  1.  No evidence for pulmonary emboli.   2.  Improved bilateral groundglass opacities.  3.  Cardiomegaly with interstitial densities/edema both lungs, pleural effusions, and reflux of contrast into the IVC and hepatic veins indicating right heart failure.   Echocardiogram Limited   Result Value Ref Range    LVEF  20-25% (severely reduced)        SIGNIFICANT LABORATORY FINDINGS     Most Recent 3 CBC's:Recent Labs   Lab Test 12/27/21  0820 12/26/21 1734 07/11/21 2106   WBC 5.7 5.0 5.7   HGB 15.1 13.6 13.1*   MCV 92 91 88    176 158     Most Recent 3 BMP's:Recent Labs   Lab Test 12/27/21  0820 12/26/21 2054 12/26/21 1734 07/11/21  2106     --  138 140   POTASSIUM 4.0 4.3 4.1 4.4   CHLORIDE 105  --  105 109*   CO2 24  --  26 21*   BUN 19  --  18 17   CR 1.41*  --  1.23 1.29   ANIONGAP 11  --  7 10   HUGH 9.5  --  8.8 9.2     --  109 95     Most Recent 2 LFT's:Recent Labs   Lab Test 12/26/21 1734 07/11/21 2106   AST 23 36   ALT 30 33   ALKPHOS 72 56   BILITOTAL 0.5 0.4     Most Recent D-dimer:Recent Labs   Lab Test 12/26/21 1734   DD 3.77*     Discharge Orders        Follow-Up with Cardiac Advanced Practice Provider      Reason for your hospital stay    Acute on chronic systolic congestive heart failure exacerbation.     Follow-up and recommended labs and tests     CHF/Core clinic follow up in 1 to 2 weeks. Patient to contact for follow up.     Activity    Your activity upon discharge: activity as tolerated     Monitor and record    Weigh yourself every morning     When to contact your care team    Contact your care  team If symptoms get worse, If increased shortness of breath, If waking up at night with difficulty breathing, If unable to lie down for sleep due to symptoms, If weight gain of 2 pounds a day for 2 days in a row OR 5 pounds in 1 week., Increased swelling in your ankles or legs, and Dizziness or lightheadedness     Discharge Follow Up - with Primary Care clinic within 3-5 days (RN to schedule prior to d/c for HE/Entira primary care     Add follow up information to the AVS prior to printing     Diet    Follow this diet upon discharge: Orders Placed This Encounter      2 Gram Sodium Diet       Examination   Physical Exam   Temp:  [97.2  F (36.2  C)-97.8  F (36.6  C)] 97.2  F (36.2  C)  Pulse:  [74-97] 75  Resp:  [16-35] 16  BP: (135-202)/() 135/98  SpO2:  [90 %-100 %] 98 %  Wt Readings from Last 1 Encounters:   12/26/21 111.5 kg (245 lb 12.8 oz)     GENERAL:  Alert, appears comfortable, in no acute distress, appears stated age   HEAD:  Normocephalic, without obvious abnormality, atraumatic   NECK: Supple, symmetrical, trachea midline   BACK:   Symmetric, no curvature, ROM normal   LUNGS:   Clear to auscultation bilaterally, no rales, rhonchi, or wheezing, symmetric chest rise on inhalation, respirations unlabored   CHEST WALL:  No tenderness or deformity   HEART:  Regular rate and rhythm, S1 and S2 normal, S3 gallop, no murmur, rub.   ABDOMEN:   Soft, non-tender, bowel sounds active all four quadrants, no masses, no organomegaly, no rebound or guarding   EXTREMITIES: Extremities normal, atraumatic, no cyanosis or edema    SKIN: Dry to touch, no exanthems in the visualized areas   NEURO: Alert, oriented x3, moves all four extremities freely, non-focal   PSYCH: Cooperative, behavior is appropriate        Please see EMR for more detailed significant labs, imaging, consultant notes etc.    Rock SINCLAIR DO, personally saw the patient today and spent greater than 30 minutes discharging this patient.    Rock  DO KRISS Black [facility]    CC:No Ref-Primary, Physician

## 2021-12-27 NOTE — PROGRESS NOTES
[facility] Daily Progress Note    Assessment/Plan:  36 year old old male with history of nonischemic cardiomyopathy diagnosed in April 2018 with initial left ventricular ejection fraction of 25%, essential hypertension, obesity, and noncompliance with outpatient medical management.   Patient admitted for symptoms of shortness of breath and orthopnea with suspicion of worsening systolic congestive heart failure/right heart failure.     Acute systolic congestive heart failure  Nonischemic cardiomyopathy  Unremarkable cardiac MRI in April 2018.  CT chest reveals cardiomegaly with interstitial densities edema both lungs and pleural effusions.  Echocardiogram 11/21/2021.  Severe left ventricular chamber enlargement, LVEF 15 to 20%  BNP 2065  Patient feels much better today.  Furosemide 40 mg twice daily  Lisinopril 20 mg twice daily  Appreciate cardiology recommendations.  Echocardiogram limited pending on 12/27.  If patient is feeling well and echocardiogram results are stable will consider discharge to home     Elevated D-dimer  CTA PE protocol negative for PE.     Accelerated essential hypertension improved.  Resume spironolactone at discharge.  Lisinopril 20 mg twice daily  Lasix as above     History of tobacco abuse and alcohol dependence  Patient admits to smoking 1/2 pack/day  nicotine patch 14 mg transdermal daily as needed  Reports no drinking since 2018     Class I Obesity Body mass index is 33.23 kg/m .     COVID-19 status:  SARS-CoV-2 PCR negative  Influenza a and B screen negative.  Standard precautions     Diet: 2 Gram Sodium Diet  DVT Prophylaxis:  Low Risk/Ambulatory with no VTE prophylaxis indicated  Code Status: Full Code    Active Problems:    Acute on chronic congestive heart failure, unspecified heart failure type (H)     LOS: 0 days     Barriers to discharge: Echocardiogram results.  Discharge Disposition: Home    Subjective:  Bill is laying in bed.  He reports orthopnea and shortness of breath are  much improved.  He was able to sleep overnight.  Had frequent urinations.  Denies any chest pain or palpitations.    carvedilol  12.5 mg Oral BID w/meals     furosemide  60 mg Intravenous Q8H     lisinopril  20 mg Oral Daily     magnesium oxide  400 mg Oral BID     nicotine  1 patch Transdermal Daily     nicotine   Transdermal Q8H     sodium chloride (PF)  3 mL Intracatheter Q8H       Objective:  Vital signs in last 24 hours:  Temp:  [97.2  F (36.2  C)-97.8  F (36.6  C)] 97.7  F (36.5  C)  Pulse:  [74-97] 74  Resp:  [16-35] 16  BP: (147-202)/() 153/105  SpO2:  [90 %-100 %] 97 %  Weight:   Weight:   @THISENCWEIGHTS(1)@  Weight change:   Body mass index is 33.34 kg/m .    Intake/Output last 3 shifts:  I/O last 3 completed shifts:  In: 1003 [P.O.:1000; I.V.:3]  Out: 4350 [Urine:4350]  Intake/Output this shift:  No intake/output data recorded.    Review of Systems:   As per subjective, all others negative.    Physical Exam:    GENERAL:  Alert, appears comfortable, in no acute distress, appears stated age   HEAD:  Normocephalic, without obvious abnormality, atraumatic   NECK: Supple, symmetrical, trachea midline   BACK:   Symmetric, no curvature, ROM normal   LUNGS:   Clear to auscultation bilaterally, no rales, rhonchi, or wheezing, symmetric chest rise on inhalation, respirations unlabored   CHEST WALL:  No tenderness or deformity   HEART:  Regular rate and rhythm, S1 and S2 normal, no murmur, rub, or gallop    ABDOMEN:   Soft, non-tender, bowel sounds active all four quadrants, no masses, no organomegaly, no rebound or guarding   EXTREMITIES: Extremities normal, atraumatic, no cyanosis or edema    SKIN: Dry to touch, no exanthems in the visualized areas   NEURO: Alert, oriented x3, moves all four extremities freely, non-focal   PSYCH: Cooperative, behavior is appropriate      Cardiographics:   I personally reviewed.  Cardiac telemetry: Sinus rhythm  Echocardiogram: limited pending.    Imaging:  Personally  Reviewed.  Results for orders placed or performed during the hospital encounter of 12/26/21   CT Chest Pulmonary Embolism w Contrast    Impression    IMPRESSION:  1.  No evidence for pulmonary emboli.   2.  Improved bilateral groundglass opacities.  3.  Cardiomegaly with interstitial densities/edema both lungs, pleural effusions, and reflux of contrast into the IVC and hepatic veins indicating right heart failure.       Lab Results:  Personally Reviewed.   Recent Labs   Lab 12/26/21  1734   WBC 5.0   HGB 13.6   HCT 41.7        Recent Labs   Lab 12/26/21  1734      CO2 26   BUN 18   ALBUMIN 3.4*   ALKPHOS 72   ALT 30   AST 23     No results for input(s): INR in the last 168 hours.    I reviewed all labs and imaging studies as of this date and I reviewed all current inpatient medications and updated them    Rock Black DO, MS  [facility]  Internal Medicine

## 2021-12-27 NOTE — PLAN OF CARE
Occupational Therapy Discharge Summary    Reason for therapy discharge:    Discharged to home.    Progress towards therapy goal(s). See goals on Care Plan in [facility] electronic health record for goal details.  Goals met    Therapy recommendation(s):    No further therapy is recommended.

## 2021-12-27 NOTE — PROGRESS NOTES
Nutrition Therapy  RD consult noted. RD is unable to see observation status patients at this time as it is a violation of CMS conditions of participation. Will assess if admission status changes.   Thank you

## 2021-12-27 NOTE — PROGRESS NOTES
Patient has been stand by assist to ambulate to bathroom. Patient has had no complaints of pain. Normal sinus rhythm with bundle branch block noted. No other concerns at this time reported by patient.

## 2021-12-27 NOTE — CONSULTS
"  Thank you,  No ref. provider found, for asking the [facility] Care team to see Cyrus Evans to evaluate dyspnea.      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  Increase lisinopril to 20 mg twice a day  Consider outpatient initiations of Entresto in place of lisinopril if insurance covers     Switch to oral diuretics-furosemide 40 mg twice a day  Continue current dose of carvedilol.  Reportedly he could not tolerate higher dose in the past    Continue spironolactone    Follow-up with CHF /core clinic in 1 to 2 weeks         History of Present Illness/Subjective    Mr. Cyrus Evans is a 36 year old male who came into the emergency with complaints of shortness of breath.  He reports the symptoms became bothersome over the course of last 2 weeks.  He was having PND and orthopnea.  He denies any weight gain and peripheral edema.  He has not had chest pains or heart palpitations.  He was given IV diuretics.  He feels \"100% better\" today  He has a history of nonischemic cardiomyopathy first diagnosed in 2018.  LVEF was about 25% at that time.  He had multiple imaging studies including cardiac MR, echo, CT coronary angiogram.  He had minimal nonobstructive coronary artery disease.  In November of this year he was admitted pharmacist he has not [facility] with decompensated heart failure.  He left the hospital AGAINST MEDICAL ADVICE.  He states that he was compliant with the medications.  He does say that he systolic blood pressure typically runs around 180 and diastolic around 120 even when he takes the medication.  According to " pharmacies he has not filled the prescriptionsat local pharmacy.    He has a history of heavy alcohol use but denies any current use.        ECG: Personally reviewed.  Normal sinus rhythm LVH with repolarization abnormalities    ECHO (personnaly Reviewed): Pending    CT chest: Interstitial edema     Physical Examination Review of Systems   BP (!) 153/105 (BP Location: Right arm)   Pulse 74   Temp 97.7  F (36.5  C) (Oral)   Resp 16   Ht 1.829 m (6')   Wt 111.5 kg (245 lb 12.8 oz)   SpO2 97%   BMI 33.34 kg/m    Body mass index is 33.34 kg/m .  Wt Readings from Last 3 Encounters:   12/26/21 111.5 kg (245 lb 12.8 oz)   07/11/21 113.4 kg (250 lb)       Intake/Output Summary (Last 24 hours) at 12/27/2021 0903  Last data filed at 12/27/2021 0500  Gross per 24 hour   Intake 1003 ml   Output 4350 ml   Net -3347 ml     General Appearance:   Alert, cooperative, no distress, appears stated age   Head/ENT: Normocephalic, without obvious abnormality. Membranes moist.      EYES:  No scleral icterus   Neck: Supple, symmetrical, trachea midline, no adenopathy, thyroid: not enlarged, symmetric, no carotid bruit or JVD   Chest/Lungs:   lungs are clear to auscultation, respirations unlabored. No tenderness or deformity   Cardiovascular:   Regular rhythm, S1, S2 normal, S3 gallop.  No murmur, rub..   Abdomen:  Soft, non-tender, bowel sounds active all four quadrants,  no masses, no organomegaly   Extremities: no cyanosis or clubbing. No edema   Skin: Skin color, texture, turgor normal, no rashes or lesions.    Neurologic: Alert and oriented x 3, moving all four extremities.    Psychiatric: Normal affect.      10 system review of systems completed see history of present illness and inpatient H&P (reviewed) for further details.          Lab Results    Chemistry/lipid CBC Cardiac Enzymes/BNP/TSH/INR   No results for input(s): CHOL, HDL, LDL, TRIG, CHOLHDLRATIO in the last 70714 hours.  No results for input(s): LDL in the last 87699  hours.  Recent Labs   Lab Test 12/26/21 2054 12/26/21 1734   NA  --  138   POTASSIUM 4.3 4.1   CHLORIDE  --  105   CO2  --  26   GLC  --  109   BUN  --  18   CR  --  1.23   GFRESTIMATED  --  78   HUGH  --  8.8     Recent Labs   Lab Test 12/26/21 1734 07/11/21 2106   CR 1.23 1.29     No results for input(s): A1C in the last 02848 hours.       Recent Labs   Lab Test 12/26/21 1734   WBC 5.0   HGB 13.6   HCT 41.7   MCV 91        Recent Labs   Lab Test 12/26/21 1734 07/11/21 2106   HGB 13.6 13.1*    Recent Labs   Lab Test 12/26/21 1734 07/11/21 2106   TROPONINI 0.07 0.10     Recent Labs   Lab Test 12/26/21 1734 07/11/21 2106   BNP 2,065* 579*     No results for input(s): TSH in the last 90134 hours.  No results for input(s): INR in the last 66584 hours.     Medical History  Surgical History Family History Social History   Hypertension  Dilated cardiomyopathy   No premature CAD, SCD,cardiomyopathy   Social History     Socioeconomic History     Marital status: Single     Spouse name: Not on file     Number of children: Not on file     Years of education: Not on file     Highest education level: Not on file   Occupational History     Not on file   Tobacco Use     Smoking status: Former Smoker     Smokeless tobacco: Not on file   Substance and Sexual Activity     Alcohol use: Not on file     Comment: Alcoholic Drinks/day: Former     Drug use: Not on file     Sexual activity: Not on file   Other Topics Concern     Not on file   Social History Narrative     Not on file     Social Determinants of Health     Financial Resource Strain: Not on file   Food Insecurity: Not on file   Transportation Needs: Not on file   Physical Activity: Not on file   Stress: Not on file   Social Connections: Not on file   Intimate Partner Violence: Not on file   Housing Stability: Not on file         Medications  Allergies   Current Facility-Administered Medications Ordered in Epic   Medication Dose Route Frequency Provider Last  Rate Last Admin     acetaminophen (TYLENOL) tablet 650 mg  650 mg Oral Q6H PRN Rock Black DO        Or     acetaminophen (TYLENOL) Suppository 650 mg  650 mg Rectal Q6H PRN Rock Black DO         carvedilol (COREG) tablet 12.5 mg  12.5 mg Oral BID w/meals Rock Black,    12.5 mg at 12/27/21 0835     furosemide (LASIX) injection 60 mg  60 mg Intravenous Q8H Rock Black DO   60 mg at 12/27/21 0420     HOLD: nitroGLYcerin IF   Does not apply HOLD Rock Black DO         hydrALAZINE (APRESOLINE) injection 10 mg  10 mg Intravenous Q4H PRN Rock Black DO         lidocaine (LMX4) cream   Topical Q1H PRN Rock Black DO         lidocaine 1 % 0.1-1 mL  0.1-1 mL Other Q1H PRN Rock Black DO         lisinopril (ZESTRIL) tablet 20 mg  20 mg Oral Daily Rock Black DO   20 mg at 12/27/21 0836     magnesium oxide (MAG-OX) tablet 400 mg  400 mg Oral BID Rock Black DO   400 mg at 12/27/21 0838     melatonin tablet 1 mg  1 mg Oral At Bedtime PRN Rock Black DO         nicotine (NICODERM CQ) 14 MG/24HR 24 hr patch 1 patch  1 patch Transdermal Daily Rock Black DO   1 patch at 12/27/21 0838     nicotine Patch in Place   Transdermal Q8H Rock Black DO         nitroGLYcerin (NITROSTAT) sublingual tablet 0.4 mg  0.4 mg Sublingual Q5 Min PRN Catrachita Serna PA-C   0.4 mg at 12/26/21 1914     ondansetron (ZOFRAN-ODT) ODT tab 4 mg  4 mg Oral Q6H PRN Rock Black DO        Or     ondansetron (ZOFRAN) injection 4 mg  4 mg Intravenous Q6H PRN Rock Black DO         polyethylene glycol (MIRALAX) Packet 17 g  17 g Oral Daily PRN Rock Black DO         senna-docusate (SENOKOT-S/PERICOLACE) 8.6-50 MG per tablet 1 tablet  1 tablet Oral BID PRN Rock Black DO        Or     senna-docusate (SENOKOT-S/PERICOLACE) 8.6-50 MG per tablet 2 tablet  2 tablet Oral BID PRN Rock Black DO         sodium chloride (PF)  0.9% PF flush 3 mL  3 mL Intracatheter Q8H Rock Black DO   3 mL at 12/27/21 0420     sodium chloride (PF) 0.9% PF flush 3 mL  3 mL Intracatheter q1 min prn Rock Black DO         No current [facility]-ordered outpatient medications on file.     No Known Allergies

## 2021-12-27 NOTE — CONSULTS
CORE Clinic was introduced to the patient today including our role in the home management of their heart failure.    Unable to see pt in person today before discharge; HF folder provided by pt JULIET Martin. Called pt and reviewed folder over the phone.     Pt lives with his fiance and 2 young children. Pt states that his fiance makes most of the meals and is familiar with a low sodium diet as she has hypertension. Pt currently weighs himself daily but does not record. Introduced pt to Daily Weight Log; he will start recording his weights daily; advised him to bring weight log to all provider appts.     Introduced pt to RN HF Line and 24 hour RN Line; instructed pt on when to call: weight increase of #2 pounds in 1 day or #7 pounds in 1 week. Reviewed in detail s/s of HF to report. Pt states that he knows when he is retaining fluid because his belly get bloated and he has SOB when laying flat to sleep.     Pt had to end call to have an Echo performed. Attempted to call pt back x2 and he was not available. Will complete HF education in clinic at Follow-up appt.     Joan Blanchard RN    [facility]  [facility]   [phone] Nurse Line  [facility]   Heart [facility]   [address] [place] [address]

## 2021-12-27 NOTE — PROGRESS NOTES
Reviewed plan for the day . Education provided for 2 gm sodium diet and CHF packet given. Weight management discussed. Verbalized understanding

## 2021-12-27 NOTE — H&P
[facility] MEDICINE ADMISSION HISTORY AND PHYSICAL   PCP:No Ref-Primary, Physician, None    Assessment & Plan    Cyrus Evans is a 36 year old old male with history of nonischemic cardiomyopathy diagnosed in April 2018 with initial left ventricular ejection fraction of 25%, essential hypertension, obesity, and noncompliance with outpatient medical management.   Patient admitted for symptoms of shortness of breath and orthopnea with suspicion of worsening systolic congestive heart failure/right heart failure.    Acute systolic congestive heart failure  Nonischemic cardiomyopathy  Unremarkable cardiac MRI in April 2018.  CT chest reveals cardiomegaly with interstitial densities edema both lungs and pleural effusions.  Echocardiogram 11/21/2021.  Severe left ventricular chamber enlargement, LVEF 15 to 20%  BNP 2065  Magnesium potassium replacement protocols while on IV diuresis  Cardiac monitoring  Order Lasix 60 mg IV every 8 hours    Elevated D-dimer  CTA PE protocol negative for PE.    Accelerated essential hypertension  Hold spironolactone while diuresis with furosemide.  resume lisinopril 20 mg daily  Hold parameters written  Order hydralazine 10 mg IV every 4 hours as needed for SBP greater than 180 mmHg    History of tobacco abuse and alcohol dependence  Patient admits to smoking 1/2 pack/day  Order nicotine patch 14 mg transdermal daily as needed  Reports no drinking since 2018    Class I Obesity Body mass index is 33.23 kg/m .    COVID-19 status:  SARS-CoV-2 PCR negative  Influenza a and B screen negative.  Standard precautions    DVTP: Low Risk Patient   Code Status: Full  Disposition: Observation admission    Chief Complaint Shortness of breath, orthopnea.     History of Present Illness:     Cyrus Evans is a 36 year old old male with history of nonischemic cardiomyopathy, heart failure reduced ejection fraction diagnosed in April 2018 with initial left ventricular ejection  fraction of 25%, essential hypertension, and obesity.    Patient was recently hospitalized at the end of November 2021 at outside hospital.  Patient presented with similar symptoms and left AGAINST MEDICAL ADVICE.  He returns with complaints of shortness of breath and orthopnea which is worsened over the past week.  He reports he has been compliant with home blood pressure and diuretic medications.  He reports occasional episodes of palpitation.  Denies any chest pain.  Describes shortness of breath with activity but especially at nighttime when trying to sleep or lay down flat.  He denies any nausea or vomiting.  No diarrhea or constipation.  Denies paresthesias or numbness.  No headache or vision changes.  No recent changes to medications.  Patient recently returned from [place] where he was taking care of his mother who suffered a brain aneurysm in January 2021.    In the emergency room patient's vital signs were remarkable for accelerated hypertension initially with blood pressure of 202/156.  Currently heart rate is controlled at 80s to 90s beats per minute.  Blood pressure last measured at 157/108.  Patient was satting 98% on room air.  Patient's initial weight was measured at 111.1 kg.  Laboratory findings were remarkable for a BNP of 2065.  Last BNP available was in the 500s prior to his previous admission.  D-dimer was elevated at 3.77.  CT chest pulmonary embolism test showed no evidence for pulmonary embolism.  There was cardiomegaly with interstitial densities and edema in both lungs with pleural effusions.    Past Medical History     Heart failure with reduced ejection fraction  Nonischemic cardiomyopathy  History of tobacco and alcohol abuse  Essential hypertension  Obesity  Medicine noncompliance    Surgical History   History reviewed. No pertinent surgical history.  Family History    Reviewed, and History reviewed. No pertinent family history.   Patient's family history was reviewed and is  non-contributory to presenting complaint.     Social History      Social History     Tobacco Use     Smoking status: Former Smoker     Smokeless tobacco: None   Substance Use Topics     Alcohol use: None     Comment: Alcoholic Drinks/day: Former     Drug use: None      Allergies   No Known Allergies  Prior to Admission Medications      Prior to Admission Medications   Prescriptions Last Dose Informant Patient Reported? Taking?   HYDROcodone-acetaminophen 5-325 mg per tablet   No No   Sig: [HYDROCODONE-ACETAMINOPHEN 5-325 MG PER TABLET] Take 1 tablet by mouth every 4 (four) hours as needed for pain.   carvedilol (COREG) 12.5 MG tablet   No No   Sig: Take 1 tablet (12.5 mg) by mouth 2 times daily (with meals)   furosemide (LASIX) 40 MG tablet   No No   Sig: Take 1 tablet (40 mg) by mouth daily   lisinopril (ZESTRIL) 20 MG tablet   No No   Sig: Take 1 tablet (20 mg) by mouth daily   spironolactone (ALDACTONE) 25 MG tablet   No No   Sig: Take 1 tablet (25 mg) by mouth daily      Facility-Administered Medications: None      Review of Systems   A 12 point comprehensive review of systems was negative except as noted above in HPI.    PHYSICAL EXAMINATION   Temp:  [97.8  F (36.6  C)] 97.8  F (36.6  C)  Pulse:  [86-95] 88  Resp:  [26-31] 31  BP: (161-202)/(120-156) 161/120  SpO2:  [90 %-100 %] 94 %  Weight:   Body mass index is 33.23 kg/m .    General Appearance:  Alert, cooperative, no distress, appears stated age   Head:  Normocephalic, without obvious abnormality, atraumatic   Eyes:  PERRL, conjunctiva/corneas clear, EOM's intact   Throat: Lips, mucosa, and tongue normal; teeth and gums normal   Neck: Supple, symmetrical, trachea midline, no adenopathy, thyroid: not enlarged, symmetric, no carotid bruit or JVD   Back:   Symmetric, no curvature, ROM normal, no CVA tenderness   Lungs:   Clear to auscultation bilaterally, respirations unlabored   Chest Wall:  No tenderness or deformity   Heart:  Regular rate and rhythm, S1,  S2 normal,no murmur, rub or gallop   Abdomen:   Soft, non-tender, bowel sounds active all four quadrants,  no masses, no organomegaly   Extremities: Extremities normal, trace lower extremity edema.   Skin: Skin color, texture, turgor normal, no rashes or ulcers noted over visible skin areas.    Neurologic: Alert and oriented X 3, Moves all 4 extremities     Results:  Recent Labs   Lab Test 12/26/21  1734      POTASSIUM 4.1   CHLORIDE 105   CO2 26      BUN 18   CR 1.23   GFRESTIMATED 78   HUGH 8.8     Recent Labs   Lab Test 12/26/21  1734 07/11/21  2106   CR 1.23 1.29     No results for input(s): A1C in the last 09746 hours.  Recent Labs   Lab Test 12/26/21 1734   WBC 5.0   HGB 13.6   HCT 41.7   MCV 91        Recent Labs   Lab Test 12/26/21 1734 07/11/21  2106   HGB 13.6 13.1*     Recent Labs   Lab Test 12/26/21 1734 07/11/21 2106   TROPONINI 0.07 0.10     Recent Labs   Lab Test 12/26/21 1734 07/11/21  2106   BNP 2,065* 579*     No results for input(s): INR in the last 53513 hours.    EKG:  EKG: Sinus rhythm, premature ventricular complexes, left anterior fascicular block, does not appear to have acute ST or T wave changes.  No significant change from July 11, 2021 EKG    Imaging:   Radiology Results:   Recent Results (from the past 24 hour(s))   CT Chest Pulmonary Embolism w Contrast    Narrative    EXAM: CT CHEST PULMONARY EMBOLISM W CONTRAST  LOCATION: [facility]  DATE/TIME: 12/26/2021 6:20 PM    INDICATION: PE suspected, low/intermediate prob, positive D-dimer  COMPARISON: CTA chest, abdomen and pelvis 11/20/2021  TECHNIQUE: CT chest pulmonary angiogram during arterial phase injection of IV contrast. Multiplanar reformats and MIP reconstructions were performed. Dose reduction techniques were used.   CONTRAST: 100ml Isovue 370    FINDINGS:  ANGIOGRAM CHEST: Pulmonary arteries are normal caliber and negative for pulmonary emboli. Thoracic aorta is negative for  dissection. No CT evidence of right heart strain.    LUNGS AND PLEURA: Moderate size right pleural effusion and small left pleural effusion with subsegmental atelectasis both lower lobes. Septal thickening and interstitial prominence both lungs suggesting edema. The groundglass opacities seen previously   within both lungs have significantly improved.    MEDIASTINUM/AXILLAE: Marked cardiac compartment. No adenopathy.    CORONARY ARTERY CALCIFICATION: None.    UPPER ABDOMEN: Reflux of contrast material into the IVC and hepatic veins.    MUSCULOSKELETAL: Normal.      Impression    IMPRESSION:  1.  No evidence for pulmonary emboli.   2.  Improved bilateral groundglass opacities.  3.  Cardiomegaly with interstitial densities/edema both lungs, pleural effusions, and reflux of contrast into the IVC and hepatic veins indicating right heart failure.       Total time spent was estimated at 75 minutes.  Greater than 35 minutes was spent in direct evaluation of patient.  Rest of time was spent in coordination of care.     Rock Black DO, MS  [facility]  Internal Medicine    12/26/2021  7:11 PM

## 2021-12-27 NOTE — PROGRESS NOTES
"PRIMARY DIAGNOSIS: \"GENERIC\" NURSING  OUTPATIENT/OBSERVATION GOALS TO BE MET BEFORE DISCHARGE:  1. ADLs back to baseline: Yes    2. Activity and level of assistance: Ambulating independently.    3. Pain status: Pain free.    4. Return to near baseline physical activity: Yes     Discharge Planner Nurse   Safe discharge environment identified: Yes  Barriers to discharge: No       Entered by: Ander Mack 12/26/2021 11:32 PM     Please review provider order for any additional goals.   Nurse to notify provider when observation goals have been met and patient is ready for discharge.  "

## 2021-12-28 ENCOUNTER — PATIENT OUTREACH (OUTPATIENT)
Dept: CARE COORDINATION | Facility: CLINIC | Age: 36
End: 2021-12-28
Payer: COMMERCIAL

## 2021-12-28 DIAGNOSIS — Z71.89 OTHER SPECIFIED COUNSELING: ICD-10-CM

## 2021-12-28 NOTE — PROGRESS NOTES
Clinic Care Coordination Contact  [facility]: Post-Discharge Note  SITUATION                                                      Admission:    Admission Date: 12/26/21   Reason for Admission: Acute on chronic systolic congestive heart failure  Discharge:   Discharge Date: 12/27/21  Discharge Diagnosis: Acute on chronic systolic congestive heart failure    BACKGROUND                                                      Cyrus Evans is a 36 year old old male with history of nonischemic cardiomyopathy, heart failure reduced ejection fraction diagnosed in April 2018 with initial left ventricular ejection fraction of 25%, essential hypertension, and obesity.    Patient was recently hospitalized at the end of November 2021 at outside hospital.  Patient presented with similar symptoms and left AGAINST MEDICAL ADVICE.  He returns with complaints of shortness of breath and orthopnea which is worsened over the past week.  He reports he has been compliant with home blood pressure and diuretic medications.  He reports occasional episodes of palpitation.  Denies any chest pain.  Describes shortness of breath with activity but especially at nighttime when trying to sleep or lay down flat.  He denies any nausea or vomiting.  No diarrhea or constipation.  Denies paresthesias or numbness.  No headache or vision changes.  No recent changes to medications.  Patient recently returned from [place] where he was taking care of his mother who suffered a brain aneurysm in January 2021.     In the emergency room patient's vital signs were remarkable for accelerated hypertension initially with blood pressure of 202/156.  Currently heart rate is controlled at 80s to 90s beats per minute.  Blood pressure last measured at 157/108.  Patient was satting 98% on room air.  Patient's initial weight was measured at 111.1 kg.  Laboratory findings were remarkable for a BNP of 2065.  Last BNP available was in the 500s prior to his previous  "admission.  D-dimer was elevated at 3.77.  CT chest pulmonary embolism test showed no evidence for pulmonary embolism.  There was cardiomegaly with interstitial densities and edema in both lungs with pleural effusions.    ASSESSMENT      Enrollment  Primary Care Care Coordination Status: Not a Candidate    Discharge Assessment  How are you doing now that you are home?: \"Doing great\"  How are your symptoms? (Red Flag symptoms escalate to triage hotline per guidelines): Improved  Do you feel your condition is stable enough to be safe at home until your provider visit?: Yes  Does the patient have their discharge instructions? : Yes  Does the patient have questions regarding their discharge instructions? : No  Were you started on any new medications or were there changes to any of your previous medications? : Yes  Does the patient have all of their medications?: Yes  Do you have questions regarding any of your medications? : No  Do you have all of your needed medical supplies or equipment (DME)?  (i.e. oxygen tank, CPAP, cane, etc.): Yes  Discharge follow-up appointment scheduled within 14 calendar days? : No  Is patient agreeable to assistance with scheduling? : No    Post-op ([facility] CTA Only)  If the patient had a surgery or procedure, do they have any questions for a nurse?: No      PLAN                                                      Outpatient Plan:    Follow-up and recommended labs and tests      CHF/Core clinic follow up in 1 to 2 weeks. Patient to contact for follow   up.      Future Appointments   Date Time Provider Department Center   1/4/2022  7:50 AM [facility]   1/4/2022  8:30 AM [facility]         For any urgent concerns, please contact our 24 hour nurse triage line: [phone] ([phone])         RAMONE Hartman  [phone]  [facility]    "

## 2022-01-19 ENCOUNTER — HOSPITAL ENCOUNTER (OUTPATIENT)
Facility: CLINIC | Age: 37
Setting detail: OBSERVATION
Discharge: HOME | End: 2022-01-20
Attending: EMERGENCY MEDICINE | Admitting: EMERGENCY MEDICINE
Payer: COMMERCIAL

## 2022-01-19 ENCOUNTER — TELEPHONE (OUTPATIENT)
Dept: CARDIOLOGY | Facility: CLINIC | Age: 37
End: 2022-01-19
Payer: COMMERCIAL

## 2022-01-19 ENCOUNTER — APPOINTMENT (OUTPATIENT)
Dept: RADIOLOGY | Facility: CLINIC | Age: 37
End: 2022-01-19
Attending: EMERGENCY MEDICINE
Payer: COMMERCIAL

## 2022-01-19 DIAGNOSIS — I50.23 ACUTE ON CHRONIC SYSTOLIC CONGESTIVE HEART FAILURE (H): ICD-10-CM

## 2022-01-19 LAB
ANION GAP SERPL CALCULATED.3IONS-SCNC: 7 MMOL/L (ref 5–18)
BASOPHILS # BLD AUTO: 0.1 10E3/UL (ref 0–0.2)
BASOPHILS NFR BLD AUTO: 1 %
BNP SERPL-MCNC: 2550 PG/ML (ref 0–35)
BUN SERPL-MCNC: 24 MG/DL (ref 8–22)
CALCIUM SERPL-MCNC: 8.8 MG/DL (ref 8.5–10.5)
CHLORIDE BLD-SCNC: 108 MMOL/L (ref 98–107)
CO2 SERPL-SCNC: 22 MMOL/L (ref 22–31)
CREAT SERPL-MCNC: 1.42 MG/DL (ref 0.7–1.3)
EOSINOPHIL # BLD AUTO: 0.1 10E3/UL (ref 0–0.7)
EOSINOPHIL NFR BLD AUTO: 1 %
ERYTHROCYTE [DISTWIDTH] IN BLOOD BY AUTOMATED COUNT: 14.2 % (ref 10–15)
FLUAV RNA SPEC QL NAA+PROBE: NEGATIVE
FLUBV RNA RESP QL NAA+PROBE: NEGATIVE
GFR SERPL CREATININE-BSD FRML MDRD: 66 ML/MIN/1.73M2
GLUCOSE BLD-MCNC: 156 MG/DL (ref 70–125)
HCT VFR BLD AUTO: 41.1 % (ref 40–53)
HGB BLD-MCNC: 13.5 G/DL (ref 13.3–17.7)
HOLD SPECIMEN: NORMAL
IMM GRANULOCYTES # BLD: 0 10E3/UL
IMM GRANULOCYTES NFR BLD: 0 %
LYMPHOCYTES # BLD AUTO: 2.1 10E3/UL (ref 0.8–5.3)
LYMPHOCYTES NFR BLD AUTO: 38 %
MAGNESIUM SERPL-MCNC: 1.8 MG/DL (ref 1.8–2.6)
MAGNESIUM SERPL-MCNC: 1.8 MG/DL (ref 1.8–2.6)
MCH RBC QN AUTO: 29.6 PG (ref 26.5–33)
MCHC RBC AUTO-ENTMCNC: 32.8 G/DL (ref 31.5–36.5)
MCV RBC AUTO: 90 FL (ref 78–100)
MONOCYTES # BLD AUTO: 0.3 10E3/UL (ref 0–1.3)
MONOCYTES NFR BLD AUTO: 6 %
NEUTROPHILS # BLD AUTO: 3 10E3/UL (ref 1.6–8.3)
NEUTROPHILS NFR BLD AUTO: 54 %
NRBC # BLD AUTO: 0 10E3/UL
NRBC BLD AUTO-RTO: 0 /100
PLATELET # BLD AUTO: 195 10E3/UL (ref 150–450)
POTASSIUM BLD-SCNC: 4 MMOL/L (ref 3.5–5)
POTASSIUM BLD-SCNC: 4 MMOL/L (ref 3.5–5)
RBC # BLD AUTO: 4.56 10E6/UL (ref 4.4–5.9)
SARS-COV-2 RNA RESP QL NAA+PROBE: NEGATIVE
SODIUM SERPL-SCNC: 137 MMOL/L (ref 136–145)
TROPONIN I SERPL-MCNC: 0.06 NG/ML (ref 0–0.29)
TROPONIN I SERPL-MCNC: 0.08 NG/ML (ref 0–0.29)
WBC # BLD AUTO: 5.5 10E3/UL (ref 4–11)

## 2022-01-19 PROCEDURE — 83735 ASSAY OF MAGNESIUM: CPT | Performed by: HOSPITALIST

## 2022-01-19 PROCEDURE — 83880 ASSAY OF NATRIURETIC PEPTIDE: CPT | Performed by: EMERGENCY MEDICINE

## 2022-01-19 PROCEDURE — 84132 ASSAY OF SERUM POTASSIUM: CPT | Mod: 91 | Performed by: HOSPITALIST

## 2022-01-19 PROCEDURE — 71045 X-RAY EXAM CHEST 1 VIEW: CPT

## 2022-01-19 PROCEDURE — 250N000013 HC RX MED GY IP 250 OP 250 PS 637: Performed by: EMERGENCY MEDICINE

## 2022-01-19 PROCEDURE — 93005 ELECTROCARDIOGRAM TRACING: CPT | Performed by: EMERGENCY MEDICINE

## 2022-01-19 PROCEDURE — 96374 THER/PROPH/DIAG INJ IV PUSH: CPT

## 2022-01-19 PROCEDURE — 85025 COMPLETE CBC W/AUTO DIFF WBC: CPT | Performed by: EMERGENCY MEDICINE

## 2022-01-19 PROCEDURE — G0378 HOSPITAL OBSERVATION PER HR: HCPCS

## 2022-01-19 PROCEDURE — 250N000013 HC RX MED GY IP 250 OP 250 PS 637: Performed by: HOSPITALIST

## 2022-01-19 PROCEDURE — 36415 COLL VENOUS BLD VENIPUNCTURE: CPT | Performed by: HOSPITALIST

## 2022-01-19 PROCEDURE — 250N000011 HC RX IP 250 OP 636: Performed by: EMERGENCY MEDICINE

## 2022-01-19 PROCEDURE — 36415 COLL VENOUS BLD VENIPUNCTURE: CPT | Performed by: EMERGENCY MEDICINE

## 2022-01-19 PROCEDURE — 80048 BASIC METABOLIC PNL TOTAL CA: CPT | Performed by: EMERGENCY MEDICINE

## 2022-01-19 PROCEDURE — 99219 PR INITIAL OBSERVATION CARE,LEVEL II: CPT | Performed by: HOSPITALIST

## 2022-01-19 PROCEDURE — 99285 EMERGENCY DEPT VISIT HI MDM: CPT | Mod: 25,CS

## 2022-01-19 PROCEDURE — 87636 SARSCOV2 & INF A&B AMP PRB: CPT | Performed by: EMERGENCY MEDICINE

## 2022-01-19 PROCEDURE — 84484 ASSAY OF TROPONIN QUANT: CPT | Mod: 91 | Performed by: EMERGENCY MEDICINE

## 2022-01-19 PROCEDURE — 84484 ASSAY OF TROPONIN QUANT: CPT | Performed by: HOSPITALIST

## 2022-01-19 RX ORDER — NITROGLYCERIN 0.4 MG/1
0.4 TABLET SUBLINGUAL ONCE
Status: COMPLETED | OUTPATIENT
Start: 2022-01-19 | End: 2022-01-19

## 2022-01-19 RX ORDER — CARVEDILOL 6.25 MG/1
12.5 TABLET ORAL 2 TIMES DAILY WITH MEALS
Status: DISCONTINUED | OUTPATIENT
Start: 2022-01-19 | End: 2022-01-20 | Stop reason: HOSPADM

## 2022-01-19 RX ORDER — SPIRONOLACTONE 25 MG/1
25 TABLET ORAL DAILY
COMMUNITY

## 2022-01-19 RX ORDER — CARVEDILOL 12.5 MG/1
12.5 TABLET ORAL 2 TIMES DAILY WITH MEALS
COMMUNITY

## 2022-01-19 RX ORDER — FUROSEMIDE 10 MG/ML
60 INJECTION INTRAMUSCULAR; INTRAVENOUS
Status: DISCONTINUED | OUTPATIENT
Start: 2022-01-20 | End: 2022-01-20 | Stop reason: HOSPADM

## 2022-01-19 RX ORDER — SPIRONOLACTONE 25 MG/1
25 TABLET ORAL DAILY
Status: DISCONTINUED | OUTPATIENT
Start: 2022-01-20 | End: 2022-01-20 | Stop reason: HOSPADM

## 2022-01-19 RX ORDER — FUROSEMIDE 10 MG/ML
40 INJECTION INTRAMUSCULAR; INTRAVENOUS ONCE
Status: COMPLETED | OUTPATIENT
Start: 2022-01-19 | End: 2022-01-19

## 2022-01-19 RX ORDER — HYDRALAZINE HYDROCHLORIDE 20 MG/ML
10 INJECTION INTRAMUSCULAR; INTRAVENOUS EVERY 6 HOURS PRN
Status: DISCONTINUED | OUTPATIENT
Start: 2022-01-19 | End: 2022-01-20 | Stop reason: HOSPADM

## 2022-01-19 RX ORDER — LISINOPRIL 20 MG/1
20 TABLET ORAL 2 TIMES DAILY
Status: DISCONTINUED | OUTPATIENT
Start: 2022-01-19 | End: 2022-01-20 | Stop reason: HOSPADM

## 2022-01-19 RX ADMIN — FUROSEMIDE 40 MG: 10 INJECTION, SOLUTION INTRAMUSCULAR; INTRAVENOUS at 17:40

## 2022-01-19 RX ADMIN — NITROGLYCERIN 0.4 MG: 0.4 TABLET SUBLINGUAL at 15:57

## 2022-01-19 RX ADMIN — CARVEDILOL 12.5 MG: 6.25 TABLET, FILM COATED ORAL at 20:51

## 2022-01-19 RX ADMIN — LISINOPRIL 20 MG: 20 TABLET ORAL at 20:51

## 2022-01-19 ASSESSMENT — MIFFLIN-ST. JEOR
SCORE: 2106.07
SCORE: 2120.14

## 2022-01-19 NOTE — TELEPHONE ENCOUNTER
"Pt calling today stating that he has severe abdominal bloating, SOB at rest and has had to sleep propped up on 2-3 pillows the last 2 nights because he was so SOB laying flat. Pt sounds SOB on the phone with writer.     Weight today is 254#; weight 2 days ago was 245#.     Pt states that he has been compliant with a low sodium diet and fluid restriction of 50-60 oz.     Pt was discharged from [facility]] on 12-27-21; HF teach done inpatient. Pt was a NO SHOW to Follow-up appt with DB on 1-4-22.     Reviewed medications pt was discharged from the hospital on; he was instructed to take Lasix 40 mg BID but has only been taking Lasix 20 mg BID because \"I read the bottle wrong.\"     No available appointment with providers in the next 24 hours; advised pt to have fiance drive him to [facility] ER now to be treated right away. Pt agreeable to plan and will go to ER now.     Joan Blanchard RN    "

## 2022-01-19 NOTE — PHARMACY-ADMISSION MEDICATION HISTORY
Pharmacy Note - Admission Medication History    Pertinent Provider Information: Patient has been mistakenly taking only 20mg of the furosemide instead of the 40mg prescribed.      ______________________________________________________________________    Prior To Admission (PTA) med list completed and updated in EMR.       PTA Med List   Medication Sig Note Last Dose     carvedilol (COREG) 12.5 MG tablet Take 12.5 mg by mouth 2 times daily (with meals)  1/19/2022 at Unknown time     furosemide (LASIX) 40 MG tablet Take 1 tablet (40 mg) by mouth 2 times daily 1/19/2022: Patient has been mistakenly taking 20mg instead of 40mg  1/19/2022 at Unknown time     lisinopril (ZESTRIL) 20 MG tablet Take 1 tablet (20 mg) by mouth 2 times daily  1/19/2022 at Unknown time     spironolactone (ALDACTONE) 25 MG tablet Take 25 mg by mouth daily  1/19/2022 at Unknown time       Information source(s): Patient, Clinic records and Hospital records  Method of interview communication: phone    Summary of Changes to PTA Med List  New: None  Discontinued: None  Changed: None    Patient was asked about OTC/herbal products specifically.  PTA med list reflects this.    In the past week, patient estimated taking medication this percent of the time:  greater than 90%.    Allergies were reviewed, assessed, and updated with the patient.      Patient does not use any multi-dose medications prior to admission.    The information provided in this note is only as accurate as the sources available at the time of the update(s).    Thank you for the opportunity to participate in the care of this patient.    Jayson Talavera RPH  1/19/2022 5:52 PM

## 2022-01-19 NOTE — ED PROVIDER NOTES
EMERGENCY DEPARTMENT ENCOUNTER      NAME: Cyrus Evans  AGE: 36 year old male  YOB: 1985  MRN: [id number]  EVALUATION DATE & TIME: 1/19/2022  3:30 PM    PCP: No Ref-Primary, Physician    ED PROVIDER: Jayson Chatman M.D.      No chief complaint on file.      FINAL IMPRESSION:  1. Acute on chronic systolic congestive heart failure (H)          ED COURSE & MEDICAL DECISION MAKING:    3:41 PM I met with the patient for the initial interview and physical examination. Discussed plan for treatment and workup in the ED. I discussed admission with the patient. Patient is agreeable. All questions answered fully.   5:34 PM I discussed the case with hospitalist, Dr. Martinez     36 year old male presents to the Emergency Department for evaluation of shortness of breath and left-sided chest pain.  He is visibly dyspneic however not hypoxic when he arrives to the emergency department.  History of systolic congestive heart failure, tobacco use, and medication nonadherence.  He has been hospitalized each of the last 2 months for congestive heart failure exacerbations.  EKG shows sinus rhythm with no acute ischemic changes, troponin is within normal limits ruling him out for ACS with 1 day of atypical sounding left-sided chest pain.  He has crackles in bilateral lung fields and chest x-ray does remain consistent with some interstitial pulmonary edema.  BNP is elevated at 2500 and he has a mild acute kidney injury, likely congestive.  It sounds like some of his presentation may be driven by the fact that he has been inadvertently using half of his prescribed Lasix dose at 20 mg twice a day.  Discussed options with the patient.  At this point given his significant shortness of breath and abdominal bloating, inability to lay flat, he does not feel comfortable with immediate discharge.  He was given 40 mg of IV Lasix and I will place him in observation for continued diuresis.  Discussed case with hospitalist.    At the  "conclusion of the encounter I discussed the results of all of the tests and the disposition. The questions were answered. The patient or family acknowledged understanding and was agreeable with the care plan.       MEDICATIONS GIVEN IN THE EMERGENCY:  Medications   nitroGLYcerin (NITROSTAT) sublingual tablet 0.4 mg (0.4 mg Sublingual Given 1/19/22 6177)   furosemide (LASIX) injection 40 mg (40 mg Intravenous Given 1/19/22 1740)       NEW PRESCRIPTIONS STARTED AT TODAY'S ER VISIT  New Prescriptions    No medications on file     =================================================================    HPI    Patient information was obtained from: Patient    Use of : N/A        Cyrus Evans is a 36 year old male with a pertinent history of CHF, alcohol abuse, CKD III, HFrEF, hypertension, and obesity who presents to this ED via walk-in for evaluation of shortness of breath and chest pain.     Patient reports history of CHF and medication noncompliance. Patient states that he has been short of breath for the past 3 days and had onset of left sided positional chest pain yesterday. Patient states that his shortness of breath is present at rest but increases while laying on his back or with exertion. He also endorses a nonproductive cough. Patient describes his left sided chest pain as worse while laying flat and improves with positioning himself on his side. He describes the chest pain as non-exertional, sharp, and non-pleuritic. Also notes his abdomen to feel more bloated than usual with nausea. Notes that he was \"confused\" about his medication until he called the nurse line and realized that he was supposed to be taking 40mg lasix 2x daily while he was taking 20mg lasix 2x daily. Other than this, patient states that he is currently taking his medications.     ScHx: Endorses tobacco use.     Per chart review,   12/26/21-12/27/21 patient was admitted to [facility] ED for symptoms of shortness of breath and " orthopnea with suspicion of worsening systolic congestive heart failure/right heart failure.  Limited echocardiogram again revealed severe depression of left ventricular ejection fraction between 20 and 25%. Patient received IV diuresis with Lasix and resolution of symptoms. Lisinopril and furosemide increased to twice daily dosing.  Patient continued on spironolactone at discharge.      REVIEW OF SYSTEMS   All systems reviewed and negative except as noted in HPI.      PAST MEDICAL HISTORY:  No past medical history on file.    PAST SURGICAL HISTORY:  No past surgical history on file.    CURRENT MEDICATIONS:    No current facility-administered medications for this encounter.     Current Outpatient Medications   Medication     carvedilol (COREG) 12.5 MG tablet     furosemide (LASIX) 40 MG tablet     lisinopril (ZESTRIL) 20 MG tablet     spironolactone (ALDACTONE) 25 MG tablet     spironolactone (ALDACTONE) 25 MG tablet     ALLERGIES:  No Known Allergies    FAMILY HISTORY:  No family history on file.    SOCIAL HISTORY:   Social History     Socioeconomic History     Marital status: Single     Spouse name: Not on file     Number of children: Not on file     Years of education: Not on file     Highest education level: Not on file   Occupational History     Not on file   Tobacco Use     Smoking status: Former Smoker     Smokeless tobacco: Not on file   Substance and Sexual Activity     Alcohol use: Not on file     Comment: Alcoholic Drinks/day: Former     Drug use: Not on file     Sexual activity: Not on file   Other Topics Concern     Not on file   Social History Narrative     Not on file     Social Determinants of Health     Financial Resource Strain: Not on file   Food Insecurity: Not on file   Transportation Needs: Not on file   Physical Activity: Not on file   Stress: Not on file   Social Connections: Not on file   Intimate Partner Violence: Not on file   Housing Stability: Not on file       VITALS:  BP (!) 171/118    Pulse 90   Temp 97.5  F (36.4  C) (Oral)   Resp 18   Ht 1.829 m (6')   Wt 115.2 kg (254 lb)   SpO2 98%   BMI 34.45 kg/m       PHYSICAL EXAM    Constitutional: Well developed, Well nourished, NAD.  HENT: Normocephalic, Atraumatic. Neck Supple.  Eyes: EOMI, Conjunctiva normal.  Respiratory: Mildly tachypneic.  Able to speak full sentences.  Crackles at bilateral lung bases.  Good air movement.  Cardiovascular: Normal heart rate, Regular rhythm. No peripheral edema.  Abdomen: Soft, nontender  Musculoskeletal: Good range of motion in all major joints. No major deformities noted.  Integument: Warm, Dry.  Neurologic: Alert & awake, Normal motor function, Normal sensory function, No focal deficits noted.   Psychiatric: Cooperative. Affect appropriate.       LAB:  All pertinent labs reviewed and interpreted.  Labs Ordered and Resulted from Time of ED Arrival to Time of ED Departure   BASIC METABOLIC PANEL - Abnormal       Result Value    Sodium 137      Potassium 4.0      Chloride 108 (*)     Carbon Dioxide (CO2) 22      Anion Gap 7      Urea Nitrogen 24 (*)     Creatinine 1.42 (*)     Calcium 8.8      Glucose 156 (*)     GFR Estimate 66     B-TYPE NATRIURETIC PEPTIDE ( EAST ONLY) - Abnormal    BNP 2,550 (*)    TROPONIN I - Normal    Troponin I 0.08     CBC WITH PLATELETS AND DIFFERENTIAL    WBC Count 5.5      RBC Count 4.56      Hemoglobin 13.5      Hematocrit 41.1      MCV 90      MCH 29.6      MCHC 32.8      RDW 14.2      Platelet Count 195      % Neutrophils 54      % Lymphocytes 38      % Monocytes 6      % Eosinophils 1      % Basophils 1      % Immature Granulocytes 0      NRBCs per 100 WBC 0      Absolute Neutrophils 3.0      Absolute Lymphocytes 2.1      Absolute Monocytes 0.3      Absolute Eosinophils 0.1      Absolute Basophils 0.1      Absolute Immature Granulocytes 0.0      Absolute NRBCs 0.0     INFLUENZA A/B & SARS-COV2 PCR MULTIPLEX       RADIOLOGY:  Reviewed all pertinent imaging. Please see  official radiology report.  XR Chest Port 1 View   Final Result   IMPRESSION: Stable moderate generalized cardiac enlargement. Mild interstitial prominence in both lungs suggestive of mild edema also stable.      No significant new findings.          EKG:    Performed at: 19-JAN-2022 15:39:50    Impression: Sinus rhythm. Possible left atrial enlargement. Left anterior fascicular block. Prolonged QT. Abnormal ECG.     Rate: 97 BPM  Rhythm: sinus  Axis: -74  WV Interval: 162 ms  QRS Interval: 116 ms  QTc Interval: 508 ms    Comparison: When compared to ECG of 26-DEC-2021 17:13, fusion complexes are no longer present, premature ventricular complexes are no longer present.     I have independently reviewed and interpreted the EKG(s) documented above.        I, Bev Holder, am serving as a scribe to document services personally performed by Dr. Jayson Chatman, based on my observation and the provider's statements to me. I, Jayson Chatman MD attest that Bev Holder is acting in a scribe capacity, has observed my performance of the services and has documented them in accordance with my direction.    Jayson Chatman M.D.  Emergency Medicine  [facility] EMERGENCY ROOM  [address] [facility] [id number] [phone]  Dept: [phone]      Jayson Chatman MD  01/19/22 5933

## 2022-01-20 VITALS
WEIGHT: 244.7 LBS | BODY MASS INDEX: 33.14 KG/M2 | HEIGHT: 72 IN | TEMPERATURE: 98 F | DIASTOLIC BLOOD PRESSURE: 88 MMHG | OXYGEN SATURATION: 99 % | RESPIRATION RATE: 18 BRPM | HEART RATE: 83 BPM | SYSTOLIC BLOOD PRESSURE: 123 MMHG

## 2022-01-20 LAB
ANION GAP SERPL CALCULATED.3IONS-SCNC: 9 MMOL/L (ref 5–18)
ATRIAL RATE - MUSE: 97 BPM
BUN SERPL-MCNC: 27 MG/DL (ref 8–22)
CALCIUM SERPL-MCNC: 9.2 MG/DL (ref 8.5–10.5)
CHLORIDE BLD-SCNC: 102 MMOL/L (ref 98–107)
CO2 SERPL-SCNC: 25 MMOL/L (ref 22–31)
CREAT SERPL-MCNC: 1.44 MG/DL (ref 0.7–1.3)
DIASTOLIC BLOOD PRESSURE - MUSE: 134 MMHG
ERYTHROCYTE [DISTWIDTH] IN BLOOD BY AUTOMATED COUNT: 14.2 % (ref 10–15)
GFR SERPL CREATININE-BSD FRML MDRD: 65 ML/MIN/1.73M2
GLUCOSE BLD-MCNC: 121 MG/DL (ref 70–125)
HCT VFR BLD AUTO: 43.9 % (ref 40–53)
HGB BLD-MCNC: 14.6 G/DL (ref 13.3–17.7)
INTERPRETATION ECG - MUSE: NORMAL
MAGNESIUM SERPL-MCNC: 1.9 MG/DL (ref 1.8–2.6)
MCH RBC QN AUTO: 29.4 PG (ref 26.5–33)
MCHC RBC AUTO-ENTMCNC: 33.3 G/DL (ref 31.5–36.5)
MCV RBC AUTO: 88 FL (ref 78–100)
P AXIS - MUSE: 52 DEGREES
PLATELET # BLD AUTO: 210 10E3/UL (ref 150–450)
POTASSIUM BLD-SCNC: 4 MMOL/L (ref 3.5–5)
PR INTERVAL - MUSE: 162 MS
QRS DURATION - MUSE: 116 MS
QT - MUSE: 400 MS
QTC - MUSE: 508 MS
R AXIS - MUSE: -74 DEGREES
RBC # BLD AUTO: 4.97 10E6/UL (ref 4.4–5.9)
SODIUM SERPL-SCNC: 136 MMOL/L (ref 136–145)
SYSTOLIC BLOOD PRESSURE - MUSE: 183 MMHG
T AXIS - MUSE: 78 DEGREES
TROPONIN I SERPL-MCNC: 0.06 NG/ML (ref 0–0.29)
VENTRICULAR RATE- MUSE: 97 BPM
WBC # BLD AUTO: 5.7 10E3/UL (ref 4–11)

## 2022-01-20 PROCEDURE — 99217 PR OBSERVATION CARE DISCHARGE: CPT | Performed by: HOSPITALIST

## 2022-01-20 PROCEDURE — 85027 COMPLETE CBC AUTOMATED: CPT | Performed by: HOSPITALIST

## 2022-01-20 PROCEDURE — G0378 HOSPITAL OBSERVATION PER HR: HCPCS

## 2022-01-20 PROCEDURE — 80048 BASIC METABOLIC PNL TOTAL CA: CPT | Performed by: HOSPITALIST

## 2022-01-20 PROCEDURE — 250N000011 HC RX IP 250 OP 636: Performed by: HOSPITALIST

## 2022-01-20 PROCEDURE — 36415 COLL VENOUS BLD VENIPUNCTURE: CPT | Performed by: HOSPITALIST

## 2022-01-20 PROCEDURE — 250N000013 HC RX MED GY IP 250 OP 250 PS 637: Performed by: HOSPITALIST

## 2022-01-20 PROCEDURE — 84484 ASSAY OF TROPONIN QUANT: CPT | Performed by: HOSPITALIST

## 2022-01-20 PROCEDURE — 96376 TX/PRO/DX INJ SAME DRUG ADON: CPT

## 2022-01-20 PROCEDURE — 83735 ASSAY OF MAGNESIUM: CPT | Performed by: HOSPITALIST

## 2022-01-20 RX ADMIN — CARVEDILOL 12.5 MG: 6.25 TABLET, FILM COATED ORAL at 08:58

## 2022-01-20 RX ADMIN — SPIRONOLACTONE 25 MG: 25 TABLET ORAL at 08:57

## 2022-01-20 RX ADMIN — LISINOPRIL 20 MG: 20 TABLET ORAL at 08:58

## 2022-01-20 RX ADMIN — FUROSEMIDE 60 MG: 10 INJECTION, SOLUTION INTRAMUSCULAR; INTRAVENOUS at 08:57

## 2022-01-20 RX ADMIN — FUROSEMIDE 60 MG: 10 INJECTION, SOLUTION INTRAMUSCULAR; INTRAVENOUS at 00:47

## 2022-01-20 ASSESSMENT — MIFFLIN-ST. JEOR: SCORE: 2077.95

## 2022-01-20 ASSESSMENT — ACTIVITIES OF DAILY LIVING (ADL): DEPENDENT_IADLS:: INDEPENDENT

## 2022-01-20 NOTE — PLAN OF CARE
PRIMARY DIAGNOSIS: CONGESTIVE HEART FAILURE  OUTPATIENT/OBSERVATION GOALS TO BE MET BEFORE DISCHARGE:  Dyspnea improved and O2 sats >88% at RA or at prior home O2 therapy level: Partially. No SOB at rest; endorses dyspnea upon exertion.        SpO2: 99 %, O2 Device: None (Room air)  Vitals:    01/19/22 1537 01/19/22 1854   Weight: 115.2 kg (254 lb) 113.8 kg (250 lb 14.4 oz)        ECHO and other diagnostic testing complete (if applicable): Yes; no outstanding orders at this time.    Return to near baseline physical activity: Yes, independent in room.    Discharge Planner Nurse   Safe discharge environment identified: No  Barriers to discharge: Yes       Entered by: Rachael Mark 01/19/2022 11:10 PM     Please review provider order for any additional goals.   Nurse to notify provider when observation goals have been met and patient is ready for discharge.

## 2022-01-20 NOTE — DISCHARGE INSTRUCTIONS
Please follow-up with your primary care provider in 3 to 5 days.  We recommend that they check a basic metabolic panel at this follow-up visit.    Follow your weights carefully as an outpatient; we suspect your dry weight is around 244 pounds.

## 2022-01-20 NOTE — PLAN OF CARE
PRIMARY DIAGNOSIS: CONGESTIVE HEART FAILURE  OUTPATIENT/OBSERVATION GOALS TO BE MET BEFORE DISCHARGE:  1. Dyspnea improved and O2 sats >88% at RA or at prior home O2 therapy level: Yes        SpO2: 100 %, O2 Device: None (Room air)  Vitals:    01/19/22 1537 01/19/22 1854 01/20/22 0358   Weight: 115.2 kg (254 lb) 113.8 kg (250 lb 14.4 oz) 111 kg (244 lb 11.2 oz)        2. ECHO and other diagnostic testing complete (if applicable): None ordered    3. Return to near baseline physical activity: Yes    Discharge Planner Nurse   Safe discharge environment identified: Yes  Barriers to discharge: Poss further diuresis.        Entered by: VERITO JOYNER 01/20/2022 6:12 AM     Please review provider order for any additional goals.   Nurse to notify provider when observation goals have been met and patient is ready for discharge.    Problem: Fluid Imbalance (Heart Failure)  Goal: Fluid Balance  Outcome: Improving     Problem: Dysrhythmia (Heart Failure)  Goal: Stable Heart Rate and Rhythm  Outcome: No Change     Pt diuresing with IV lasix. Denies chest pain, some SOB when up to BR for extended amount of time, O2 remains WNL on RA. Bps remain elevated, but not enough to meet hydralazine parameters. Remains NSR, some prolonged Qtc.

## 2022-01-20 NOTE — H&P
[facility] MEDICINE ADMISSION HISTORY AND PHYSICAL     Assessment & Plan      Cyrus Evans is a 36 year old old male with a history of congestive heart failure with reduced ejection fraction who presented to the emergency department complaints of shortness of breath, left-sided chest pain.  Reportedly was taking lower dose of furosemide mistakingly prior to presentation.    Evaluation in the emergency department was significant for hypertension, BNP of 2500, creatinine of 1.4 which was unchanged from 3 weeks prior.    Assessment and Plan:  Acute congestive heart failure with reduced ejection fraction  Likely due to inadvertently taking too little diuretic at home  Will treat with IV Lasix bid, follow renal function, volume status, weights, troponin  Continue home antihypertensive medications including Coreg, lisinopril, spironolactone  Possibly discharge tomorrow if clinically improving    DVT proph: early ambulation  Code Status: full  Disposition: Observation   Diet:low salt  Pain tx: none  PT/OT: none      Chief Complaint  shortness of breath     HISTORY     Cyrus Evans is a 36 year old old male with a history of heart failure who presented with shortness of breath.  He has also noticed that he is up about 9 pounds in the last couple of days.  Felt that his abdomen was more distended.  Also had some chest pain or discomfort in the left upper neck area.  His shortness of breath that is worse with activity or laying down.  He says that he has been taking 20 mg of Lasix twice daily when he was actually supposed post to be taking 40 mg twice daily.  Denies fever, chills, cough, dysuria.  He did have some nausea and vomiting the night prior to presentation.    Past Medical History   No past medical history on file.  Congestive heart failure, essential hypertension  Surgical History   No past surgical history on file.  Denies any history of surgery on his abdomen  Family History     Reviewed, and No family history on file.  Father had a stroke  Social History      Social History     Tobacco Use     Smoking status: Former Smoker     Smokeless tobacco: Not on file   Substance Use Topics     Alcohol use: Not on file     Comment: Alcoholic Drinks/day: Former     Drug use: Not on file      Allergies   No Known Allergies  Prior to Admission Medications      Prior to Admission Medications   Prescriptions Last Dose Informant Patient Reported? Taking?   carvedilol (COREG) 12.5 MG tablet 1/19/2022 at Unknown time  Yes Yes   Sig: Take 12.5 mg by mouth 2 times daily (with meals)   furosemide (LASIX) 40 MG tablet 1/19/2022 at Unknown time  No Yes   Sig: Take 1 tablet (40 mg) by mouth 2 times daily   lisinopril (ZESTRIL) 20 MG tablet 1/19/2022 at Unknown time  No Yes   Sig: Take 1 tablet (20 mg) by mouth 2 times daily   spironolactone (ALDACTONE) 25 MG tablet   No No   Sig: Take 1 tablet (25 mg) by mouth daily   spironolactone (ALDACTONE) 25 MG tablet 1/19/2022 at Unknown time  Yes Yes   Sig: Take 25 mg by mouth daily      Facility-Administered Medications: None      Review of Systems   A 12 point comprehensive review of systems was negative except as noted above in HPI.  PHYSICAL EXAMINATION     Vitals    Temp:  [97.5  F (36.4  C)] 97.5  F (36.4  C)  Pulse:  [] 95  Resp:  [15-24] 17  BP: (159-190)/(106-134) 190/124  SpO2:  [95 %-100 %] 95 %  Examination   Physical Exam:    Gen: no acute distress, comfortable, alert, pleasant  ENT: no scleral icterus  Pulm: lungs are clear anteriorly with some basilar crackles  CV: regular rate and rhythm, no pitting edema  GI: abdomen is soft, non-tender, non-distended with active bowel sounds.  MSK: no significant peripheral pitting edema, no noticeable swelling/erythema or warmth of joints.  Derm: no rashes on examined areas of skin, no jaundice, skin is dry and warm.   Psych: appropriate affect    Joaquin Martinez, [facility]  [facility]   Phone: #[phone]

## 2022-01-20 NOTE — DISCHARGE SUMMARY
[facility] MEDICINE  DISCHARGE SUMMARY     Primary Care Physician: No Ref-Primary, Physician  Admission Date: 1/19/2022   Discharge Provider: Joaquin Martinez DO Discharge Date: 1/20/2022   Diet:   Active Diet and Nourishment Order   Procedures     2 Gram Sodium Diet No Caffeine for 24 hours (once tests completed, may have caffeine)     Diet       Code Status: Full Code   Activity: As tolerated        Condition at Discharge: Stable     REASON FOR PRESENTATION(See Admission Note for Details)   Shortness of breath  PRINCIPAL & ACTIVE DISCHARGE DIAGNOSES   Acute on chronic congestive heart failure with reduced ejection fraction  PENDING LABS     Unresulted Labs Ordered in the Past 30 Days of this Admission     No orders found for last 31 day(s).        PROCEDURES ( this hospitalization only)    Known  RECOMMENDATIONS TO OUTPATIENT PROVIDER FOR F/U VISIT   Follow-up Appointments     Add follow up information to the AVS prior to printing        Follow-up with primary care in 3 to 5 days  Would check basic metabolic panel at follow-up visit  Follow weights carefully as an outpatient, suspect dry weight around 244 pounds  DISPOSITION   Home  SUMMARY OF HOSPITAL COURSE:    Cyrus Evans is a 36 year old old male with a history of congestive heart failure with reduced ejection fraction who presented to the emergency department complaints of shortness of breath, left-sided chest pain.  Was accidentally taking lower dose of furosemide prior to presentation.  He was prescribed 40 mg of Lasix twice daily but he was only taking 20 mg twice daily.  He noticed his weight was up about 9 pounds and he was very short of breath with activity or laying down.     Evaluation in the emergency department was significant for hypertension, BNP of 2500, creatinine of 1.4 which was unchanged from 3 weeks prior.    He was treated with IV Lasix, clinically feeling better the next morning, not requiring  oxygen.  Recommend that he continue his home dose of 40 mg twice daily of Lasix and follow low-salt diet avoiding excessive liquid intake.  He should continue to monitor his weight and follow-up with primary care in 3 to 5 days     Discharge Medications with Med changes:     Current Discharge Medication List      CONTINUE these medications which have NOT CHANGED    Details   carvedilol (COREG) 12.5 MG tablet Take 12.5 mg by mouth 2 times daily (with meals)      furosemide (LASIX) 40 MG tablet Take 1 tablet (40 mg) by mouth 2 times daily  Qty: 60 tablet, Refills: 0    Associated Diagnoses: Acute on chronic systolic congestive heart failure (H); Accelerated essential hypertension      lisinopril (ZESTRIL) 20 MG tablet Take 1 tablet (20 mg) by mouth 2 times daily  Qty: 60 tablet, Refills: 0    Associated Diagnoses: Acute on chronic systolic congestive heart failure (H); Accelerated essential hypertension      spironolactone (ALDACTONE) 25 MG tablet Take 25 mg by mouth daily           Rationale for medication changes:    Did not change medications, patient counseled on taking medication as prescribed  Consults   None  Anticoagulation Information    Not applicable  SIGNIFICANT IMAGING FINDINGS     Results for orders placed or performed during the hospital encounter of 01/19/22   XR Chest Port 1 View    Impression    IMPRESSION: Stable moderate generalized cardiac enlargement. Mild interstitial prominence in both lungs suggestive of mild edema also stable.    No significant new findings.     SIGNIFICANT LABORATORY FINDINGS   Creatinine of 1.44, stable for the last 3 weeks  Troponin normal x3  BNP elevated at 2550  COVID-negative  Discharge Orders        Activity    Your activity upon discharge: activity as tolerated     Monitor and record    Weigh yourself every morning     When to contact your care team    Contact your care team If symptoms get worse, If increased shortness of breath, If waking up at night with  difficulty breathing, If unable to lie down for sleep due to symptoms and If weight gain of 2 pounds a day for 2 days in a row OR 5 pounds in 1 week.     Discharge Follow Up - with Primary Care clinic within 3-5 days (RN to schedule prior to d/c for [facility] primary care     Add follow up information to the AVS prior to printing     Reason for your hospital stay    Congestive heart failure     Diet    Follow this diet upon discharge: Orders Placed This Encounter      2 Gram Sodium Diet     Examination   Physical Exam   Temp:  [97.5  F (36.4  C)-98  F (36.7  C)] 97.9  F (36.6  C)  Pulse:  [] 89  Resp:  [15-24] 18  BP: (147-190)/(106-134) 157/109  SpO2:  [95 %-100 %] 95 %  Wt Readings from Last 1 Encounters:   01/20/22 111 kg (244 lb 11.2 oz)       Subjective: Feeling better this morning.  Breathing is easier.  No chest pain this morning.  Said that overnight he did wake up a few times feeling very short of breath.  Was able to sleep very soundly which he could not do for the previous several days.    Physical Exam:    Gen: no acute distress, comfortable  ENT: no scleral icterus  Pulm: lungs are clear without wheezing, crackles  CV: regular rate and rhythm, no pitting edema, no events noted on telemetry overnight      Please see EMR for more detailed significant labs, imaging, consultant notes etc.    Joaquin SINCLAIR DO, personally saw the patient today and spent greater than 30 minutes discharging this patient.    Joaquin Martinez DO  [facility]    CC:No Ref-Primary, Physician

## 2022-01-20 NOTE — PLAN OF CARE
Pt cleared for discharge. Discharge teaching provided; pt questions answered to pt satisfaction. Transport walked pt to hospital entrance. Pt confirmed all belongings leaving with him.

## 2022-01-20 NOTE — CONSULTS
Care Management Initial Consult    General Information  Assessment completed with: Patient, patient  Type of CM/SW Visit: Initial Assessment    Primary Care Provider verified and updated as needed: Yes   Readmission within the last 30 days: yes, previous admission in last 30 days,previous discharge plan unsuccessful   Return Category: Medically unsuccessful treatment plan  Reason for Consult: discharge planning  Advance Care Planning: Advance Care Planning Reviewed: education/resources on health care directives provided          Communication Assessment  Patient's communication style: spoken language (English or Bilingual)    Hearing Difficulty or Deaf: no   Wear Glasses or Blind: no    Cognitive  Cognitive/Neuro/Behavioral: WDL                      Living Environment:   People in home: significant other,child(estefany), dependent     Current living Arrangements: apartment      Able to return to prior arrangements: yes       Family/Social Support:  Care provided by: self  Provides care for: child(estefany)  Marital Status: Lives with Significant Other  Significant Other,Children,Parent(s)       Della  Description of Support System: Supportive,Involved    Support Assessment: Adequate family and caregiver support,Adequate social supports    Current Resources:   Patient receiving home care services: No     Community Resources: None  Equipment currently used at home: none  Supplies currently used at home: None    Employment/Financial:  Employment Status: employed full-time        Financial Concerns: No concerns identified   Referral to Financial Counselor: No       Lifestyle & Psychosocial Needs:  Social Determinants of Health     Tobacco Use: Medium Risk     Smoking Tobacco Use: Former Smoker     Smokeless Tobacco Use: Unknown   Alcohol Use: Not on file   Financial Resource Strain: Not on file   Food Insecurity: Not on file   Transportation Needs: Not on file   Physical Activity: Not on file   Stress: Not on file   Social  Connections: Not on file   Intimate Partner Violence: Not on file   Depression: Not on file   Housing Stability: Not on file       Functional Status:  Prior to admission patient needed assistance:   Dependent ADLs:: Independent  Dependent IADLs:: Independent  Assesssment of Functional Status: At functional baseline    Mental Health Status:  Mental Health Status: No Current Concerns       Chemical Dependency Status:  Chemical Dependency Status: No Current Concerns             Values/Beliefs:  Spiritual, Cultural Beliefs, Mu-ism Practices, Values that affect care: no               Additional Information:  RNCM met with patient to introduce self and review role of care management, progression of care and possible need for services at discharge.  Patient lives with significant other, Della, and 2 children (ages 15, 12).  Patient does not use and DME or have services in the community.  He states he is employed full-time.  He is independent with all ADL's/IADL's.  He drove himself to the hospital and plans to transport self at discharge.  [facility] Directive discussed and provided to patient.  He will complete on his own.  Pt denies need for any other CM services at this time.    Indira Diaz RN

## 2022-01-21 ENCOUNTER — PATIENT OUTREACH (OUTPATIENT)
Dept: CARE COORDINATION | Facility: CLINIC | Age: 37
End: 2022-01-21
Payer: COMMERCIAL

## 2022-01-21 DIAGNOSIS — Z71.89 OTHER SPECIFIED COUNSELING: ICD-10-CM

## 2022-01-21 NOTE — PROGRESS NOTES
"Clinic Care Coordination Contact  [facility]: Post-Discharge Note  SITUATION                                                      Admission:    Admission Date: 01/19/22   Reason for Admission: Acute on chronic congestive heart failure with reduced ejection fraction  Discharge:   Discharge Date: 01/20/22  Discharge Diagnosis: Acute on chronic congestive heart failure with reduced ejection fraction    BACKGROUND                                                      Cyrus Evans is a 36 year old old male with a history of heart failure who presented with shortness of breath.  He has also noticed that he is up about 9 pounds in the last couple of days.  Felt that his abdomen was more distended.  Also had some chest pain or discomfort in the left upper neck area.  His shortness of breath that is worse with activity or laying down.  He says that he has been taking 20 mg of Lasix twice daily when he was actually supposed post to be taking 40 mg twice daily.  Denies fever, chills, cough, dysuria.  He did have some nausea and vomiting the night prior to presentation.    ASSESSMENT      Enrollment  Primary Care Care Coordination Status: Not a Candidate    Discharge Assessment  How are you doing now that you are home?: \"Doing good\"  How are your symptoms? (Red Flag symptoms escalate to triage hotline per guidelines): Improved  Do you feel your condition is stable enough to be safe at home until your provider visit?: Yes  Does the patient have their discharge instructions? : Yes  Does the patient have questions regarding their discharge instructions? : No  Were you started on any new medications or were there changes to any of your previous medications? : No  Does the patient have all of their medications?: Yes  Do you have questions regarding any of your medications? : No  Do you have all of your needed medical supplies or equipment (DME)?  (i.e. oxygen tank, CPAP, cane, etc.): Yes  Discharge follow-up appointment " scheduled within 14 calendar days? : No  Is patient agreeable to assistance with scheduling? : No    Post-op ([facility] Only)  If the patient had a surgery or procedure, do they have any questions for a nurse?: No      PLAN                                                      Outpatient Plan:    Follow-up with primary care in 3 to 5 days  Would check basic metabolic panel at follow-up visit  Follow weights carefully as an outpatient, suspect dry weight around 244 pounds    No future appointments.      For any urgent concerns, please contact our 24 hour nurse triage line: [phone])         RAMONE Hartman  [phone]  Connected Care Resource [facility]

## 2022-02-19 ENCOUNTER — HOSPITAL ENCOUNTER (EMERGENCY)
Facility: CLINIC | Age: 37
Discharge: HOME | End: 2022-02-19
Attending: EMERGENCY MEDICINE | Admitting: EMERGENCY MEDICINE
Payer: MEDICAID

## 2022-02-19 ENCOUNTER — APPOINTMENT (OUTPATIENT)
Dept: CT IMAGING | Facility: CLINIC | Age: 37
End: 2022-02-19
Attending: EMERGENCY MEDICINE
Payer: MEDICAID

## 2022-02-19 VITALS
DIASTOLIC BLOOD PRESSURE: 82 MMHG | HEART RATE: 80 BPM | RESPIRATION RATE: 16 BRPM | SYSTOLIC BLOOD PRESSURE: 152 MMHG | WEIGHT: 240 LBS | BODY MASS INDEX: 32.55 KG/M2 | OXYGEN SATURATION: 99 % | TEMPERATURE: 98.4 F

## 2022-02-19 DIAGNOSIS — K29.00 ACUTE GASTRITIS WITHOUT HEMORRHAGE, UNSPECIFIED GASTRITIS TYPE: ICD-10-CM

## 2022-02-19 LAB
ALBUMIN SERPL-MCNC: 3.7 G/DL (ref 3.5–5)
ALBUMIN UR-MCNC: 300 MG/DL
ALP SERPL-CCNC: 74 U/L (ref 45–120)
ALT SERPL W P-5'-P-CCNC: 15 U/L (ref 0–45)
ANION GAP SERPL CALCULATED.3IONS-SCNC: 11 MMOL/L (ref 5–18)
APPEARANCE UR: CLEAR
AST SERPL W P-5'-P-CCNC: 18 U/L (ref 0–40)
BASOPHILS # BLD AUTO: 0 10E3/UL (ref 0–0.2)
BASOPHILS NFR BLD AUTO: 1 %
BILIRUB SERPL-MCNC: 0.5 MG/DL (ref 0–1)
BILIRUB UR QL STRIP: NEGATIVE
BUN SERPL-MCNC: 19 MG/DL (ref 8–22)
CALCIUM SERPL-MCNC: 9.7 MG/DL (ref 8.5–10.5)
CHLORIDE BLD-SCNC: 105 MMOL/L (ref 98–107)
CO2 SERPL-SCNC: 25 MMOL/L (ref 22–31)
COLOR UR AUTO: YELLOW
CREAT SERPL-MCNC: 1.23 MG/DL (ref 0.7–1.3)
EOSINOPHIL # BLD AUTO: 0 10E3/UL (ref 0–0.7)
EOSINOPHIL NFR BLD AUTO: 1 %
ERYTHROCYTE [DISTWIDTH] IN BLOOD BY AUTOMATED COUNT: 14.4 % (ref 10–15)
GFR SERPL CREATININE-BSD FRML MDRD: 78 ML/MIN/1.73M2
GLUCOSE BLD-MCNC: 125 MG/DL (ref 70–125)
GLUCOSE UR STRIP-MCNC: NEGATIVE MG/DL
HCT VFR BLD AUTO: 44.5 % (ref 40–53)
HGB BLD-MCNC: 14.7 G/DL (ref 13.3–17.7)
HGB UR QL STRIP: ABNORMAL
IMM GRANULOCYTES # BLD: 0 10E3/UL
IMM GRANULOCYTES NFR BLD: 0 %
KETONES UR STRIP-MCNC: NEGATIVE MG/DL
LEUKOCYTE ESTERASE UR QL STRIP: NEGATIVE
LYMPHOCYTES # BLD AUTO: 2.3 10E3/UL (ref 0.8–5.3)
LYMPHOCYTES NFR BLD AUTO: 37 %
MCH RBC QN AUTO: 29.4 PG (ref 26.5–33)
MCHC RBC AUTO-ENTMCNC: 33 G/DL (ref 31.5–36.5)
MCV RBC AUTO: 89 FL (ref 78–100)
MONOCYTES # BLD AUTO: 0.3 10E3/UL (ref 0–1.3)
MONOCYTES NFR BLD AUTO: 4 %
NEUTROPHILS # BLD AUTO: 3.7 10E3/UL (ref 1.6–8.3)
NEUTROPHILS NFR BLD AUTO: 57 %
NITRATE UR QL: NEGATIVE
NRBC # BLD AUTO: 0 10E3/UL
NRBC BLD AUTO-RTO: 0 /100
PH UR STRIP: 6 [PH] (ref 5–7)
PLATELET # BLD AUTO: 201 10E3/UL (ref 150–450)
POTASSIUM BLD-SCNC: 4.7 MMOL/L (ref 3.5–5)
PROT SERPL-MCNC: 7.4 G/DL (ref 6–8)
RBC # BLD AUTO: 5 10E6/UL (ref 4.4–5.9)
RBC URINE: 1 /HPF
SARS-COV-2 RNA RESP QL NAA+PROBE: NEGATIVE
SODIUM SERPL-SCNC: 141 MMOL/L (ref 136–145)
SP GR UR STRIP: 1.02 (ref 1–1.03)
UROBILINOGEN UR STRIP-MCNC: 3 MG/DL
WBC # BLD AUTO: 6.4 10E3/UL (ref 4–11)
WBC URINE: <1 /HPF

## 2022-02-19 PROCEDURE — 80053 COMPREHEN METABOLIC PANEL: CPT | Performed by: EMERGENCY MEDICINE

## 2022-02-19 PROCEDURE — 74176 CT ABD & PELVIS W/O CONTRAST: CPT

## 2022-02-19 PROCEDURE — 96374 THER/PROPH/DIAG INJ IV PUSH: CPT

## 2022-02-19 PROCEDURE — 85025 COMPLETE CBC W/AUTO DIFF WBC: CPT | Performed by: EMERGENCY MEDICINE

## 2022-02-19 PROCEDURE — 250N000013 HC RX MED GY IP 250 OP 250 PS 637: Performed by: EMERGENCY MEDICINE

## 2022-02-19 PROCEDURE — 36415 COLL VENOUS BLD VENIPUNCTURE: CPT | Performed by: EMERGENCY MEDICINE

## 2022-02-19 PROCEDURE — 96375 TX/PRO/DX INJ NEW DRUG ADDON: CPT

## 2022-02-19 PROCEDURE — 99285 EMERGENCY DEPT VISIT HI MDM: CPT | Mod: 25

## 2022-02-19 PROCEDURE — 87635 SARS-COV-2 COVID-19 AMP PRB: CPT | Performed by: EMERGENCY MEDICINE

## 2022-02-19 PROCEDURE — 96361 HYDRATE IV INFUSION ADD-ON: CPT

## 2022-02-19 PROCEDURE — 81001 URINALYSIS AUTO W/SCOPE: CPT | Performed by: EMERGENCY MEDICINE

## 2022-02-19 PROCEDURE — 82040 ASSAY OF SERUM ALBUMIN: CPT | Performed by: EMERGENCY MEDICINE

## 2022-02-19 PROCEDURE — 258N000003 HC RX IP 258 OP 636: Performed by: EMERGENCY MEDICINE

## 2022-02-19 PROCEDURE — 250N000011 HC RX IP 250 OP 636: Performed by: EMERGENCY MEDICINE

## 2022-02-19 RX ORDER — KETOROLAC TROMETHAMINE 15 MG/ML
15 INJECTION, SOLUTION INTRAMUSCULAR; INTRAVENOUS ONCE
Status: COMPLETED | OUTPATIENT
Start: 2022-02-19 | End: 2022-02-19

## 2022-02-19 RX ORDER — ACETAMINOPHEN 325 MG/1
650 TABLET ORAL ONCE
Status: COMPLETED | OUTPATIENT
Start: 2022-02-19 | End: 2022-02-19

## 2022-02-19 RX ORDER — ONDANSETRON 4 MG/1
4 TABLET, ORALLY DISINTEGRATING ORAL EVERY 8 HOURS PRN
Qty: 10 TABLET | Refills: 0 | Status: SHIPPED | OUTPATIENT
Start: 2022-02-19 | End: 2022-02-22

## 2022-02-19 RX ORDER — ONDANSETRON 2 MG/ML
4 INJECTION INTRAMUSCULAR; INTRAVENOUS ONCE
Status: COMPLETED | OUTPATIENT
Start: 2022-02-19 | End: 2022-02-19

## 2022-02-19 RX ADMIN — ACETAMINOPHEN 650 MG: 325 TABLET, FILM COATED ORAL at 09:48

## 2022-02-19 RX ADMIN — ONDANSETRON 4 MG: 2 INJECTION INTRAMUSCULAR; INTRAVENOUS at 09:45

## 2022-02-19 RX ADMIN — KETOROLAC TROMETHAMINE 15 MG: 15 INJECTION, SOLUTION INTRAMUSCULAR; INTRAVENOUS at 09:44

## 2022-02-19 RX ADMIN — SODIUM CHLORIDE 500 ML: 9 INJECTION, SOLUTION INTRAVENOUS at 09:56

## 2022-02-19 NOTE — ED PROVIDER NOTES
EMERGENCY DEPARTMENT ENCOUNTER      NAME: Cryus Evans  AGE: 36 year old male  YOB: 1985  MRN: [id number]  EVALUATION DATE & TIME: 2/19/2022  9:08 AM    PCP: No Ref-Primary, Physician    ED PROVIDER: Benita Wetzel M.D.      Chief Complaint   Patient presents with     Abdominal Pain         FINAL IMPRESSION:  1. Acute gastritis without hemorrhage, unspecified gastritis type          ED COURSE & MEDICAL DECISION MAKING:    ED Course as of 02/19/22 1132   Sat Feb 19, 2022   0921 Pt with atypical sharp left abdominal pain not reproducible on examination with reassuringly normal VS and otherwise negative ROS, family h/o kidney stones noted, patient amenable to plan to check CT, CMP and UA, IVF low dose with CHF history, ketorolac/tylenol/zofran and reassess   1018 UA reassuringly negative with chemistry and CBC WNL   1019 CT with nonspecific slight gallbladder wall edema without pericholecystic fluid, sludge or stones and since RUQ palpation with no pain and no transaminitis present, unlikely acute hepatobiliary pathology, will reassess now   1029 Pt clinically reassessed and feels somewhat improved, will PO challenge    1125 Pt clinically reassessed and tolerating PO which is reassuring, amenableto plan to dc to home with PMD f/u and Rx zofran. Patient discharged after being provided with extensive anticipatory guidance and given return precautions, importance of PMD follow-up emphasized.        Pertinent Labs & Imaging studies reviewed. (See chart for details)  9:19 AM I met with patient for initial interview and encounter.   10:30 AM I updated patient with lab and imaging results.  11:08 AM I reassessed patient.    N95 worn  A face shield was worn also  COVID PPE      At the conclusion of the encounter I discussed the results of all of the tests and the disposition. The questions were answered. The patient or family acknowledged understanding and was agreeable with the care plan.     MEDICATIONS  GIVEN IN THE EMERGENCY:  Medications   0.9% sodium chloride BOLUS (500 mLs Intravenous New Bag 2/19/22 0956)   ketorolac (TORADOL) injection 15 mg (15 mg Intravenous Given 2/19/22 0944)   acetaminophen (TYLENOL) tablet 650 mg (650 mg Oral Given 2/19/22 0948)   ondansetron (ZOFRAN) injection 4 mg (4 mg Intravenous Given 2/19/22 0945)       NEW PRESCRIPTIONS STARTED AT TODAY'S ER VISIT  New Prescriptions    ONDANSETRON (ZOFRAN ODT) 4 MG ODT TAB    Take 1 tablet (4 mg) by mouth every 8 hours as needed for nausea          =================================================================    HPI      Cyrus Evans is a 36 year old male with PMHx of HTN, CHF who presents to the ED today via via walk-in with abdominal pain.    Patient reports he has had 3 -4 days of worsening sharp LUQ abdominal pain. Notes that yesterday he became unable to keep down any food, stating that he would vomit about 30 minutes after eating. He denies any history of this pain, but does endorse a family history of kidney stones. States his pain is currently 9/10. Denies any fever, constipation, cough, hematuria, or any other complaints.      REVIEW OF SYSTEMS   All other systems reviewed and are negative except as noted above in HPI.    PAST MEDICAL HISTORY:  History reviewed. No pertinent past medical history.    PAST SURGICAL HISTORY:  History reviewed. No pertinent surgical history.    CURRENT MEDICATIONS:    ondansetron (ZOFRAN ODT) 4 MG ODT tab  carvedilol (COREG) 12.5 MG tablet  furosemide (LASIX) 40 MG tablet  lisinopril (ZESTRIL) 20 MG tablet  spironolactone (ALDACTONE) 25 MG tablet        ALLERGIES:  No Known Allergies    FAMILY HISTORY:  No family history on file.    SOCIAL HISTORY:   Social History     Socioeconomic History     Marital status: Single     Spouse name: Not on file     Number of children: Not on file     Years of education: Not on file     Highest education level: Not on file   Occupational History     Not on file    Tobacco Use     Smoking status: Former Smoker     Smokeless tobacco: Not on file   Substance and Sexual Activity     Alcohol use: Not on file     Comment: Alcoholic Drinks/day: Former     Drug use: Not on file     Sexual activity: Not on file   Other Topics Concern     Not on file   Social History Narrative     Not on file     Social Determinants of Health     Financial Resource Strain: Not on file   Food Insecurity: Not on file   Transportation Needs: Not on file   Physical Activity: Not on file   Stress: Not on file   Social Connections: Not on file   Intimate Partner Violence: Not on file   Housing Stability: Not on file       VITALS:  Patient Vitals for the past 24 hrs:   BP Temp Temp src Pulse Resp SpO2 Weight   02/19/22 1000 (!) 164/126 -- -- 70 -- 97 % --   02/19/22 0904 (!) 170/119 98.1  F (36.7  C) Temporal 88 28 98 % 108.9 kg (240 lb)       PHYSICAL EXAM    GENERAL: Awake, alert.  In no acute distress.   HEENT: Normocephalic, atraumatic.  Pupils equal, round and reactive.  Conjunctiva normal.  EOMI.  NECK: No stridor or apparent deformity.  PULMONARY: Symmetrical breath sounds without distress.  Lungs clear to auscultation bilaterally without wheezes, rhonchi or rales.  CARDIO: Regular rate and rhythm.  No significant murmur, rub or gallop.  Radial pulses strong and symmetrical.  ABDOMINAL: No reproducible tenderness. Abdomen soft, non-distended and non-tender to palpation.  No CVAT, no palpable hepatosplenomegaly.  EXTREMITIES: No lower extremity swelling or edema.    NEURO: Alert and oriented to person, place and time.  Cranial nerves grossly intact.  No focal motor deficit.  PSYCH: Normal mood and affect  SKIN: No rashes      LAB:  All pertinent labs reviewed and interpreted.  Results for orders placed or performed during the hospital encounter of 02/19/22   CT Abdomen Pelvis w/o Contrast    Impression    IMPRESSION:   1.  No renal calculi. No hydronephrosis.    2.  Nonspecific bilateral perinephric  stranding.    3.  Nonspecific gallbladder wall edema.     Comprehensive metabolic panel   Result Value Ref Range    Sodium 141 136 - 145 mmol/L    Potassium 4.7 3.5 - 5.0 mmol/L    Chloride 105 98 - 107 mmol/L    Carbon Dioxide (CO2) 25 22 - 31 mmol/L    Anion Gap 11 5 - 18 mmol/L    Urea Nitrogen 19 8 - 22 mg/dL    Creatinine 1.23 0.70 - 1.30 mg/dL    Calcium 9.7 8.5 - 10.5 mg/dL    Glucose 125 70 - 125 mg/dL    Alkaline Phosphatase 74 45 - 120 U/L    AST 18 0 - 40 U/L    ALT 15 0 - 45 U/L    Protein Total 7.4 6.0 - 8.0 g/dL    Albumin 3.7 3.5 - 5.0 g/dL    Bilirubin Total 0.5 0.0 - 1.0 mg/dL    GFR Estimate 78 >60 mL/min/1.73m2   UA with Microscopic reflex to Culture    Specimen: Urine, Midstream   Result Value Ref Range    Color Urine Yellow Colorless, Straw, Light Yellow, Yellow    Appearance Urine Clear Clear    Glucose Urine Negative Negative mg/dL    Bilirubin Urine Negative Negative    Ketones Urine Negative Negative mg/dL    Specific Gravity Urine 1.025 1.001 - 1.030    Blood Urine 0.03 mg/dL (A) Negative    pH Urine 6.0 5.0 - 7.0    Protein Albumin Urine 300  (A) Negative mg/dL    Urobilinogen Urine 3.0 (A) <2.0 mg/dL    Nitrite Urine Negative Negative    Leukocyte Esterase Urine Negative Negative    RBC Urine 1 <=2 /HPF    WBC Urine <1 <=5 /HPF   Asymptomatic COVID-19 Virus (Coronavirus) by PCR Nasopharyngeal    Specimen: Nasopharyngeal; Swab   Result Value Ref Range    SARS CoV2 PCR Negative Negative   CBC with platelets and differential   Result Value Ref Range    WBC Count 6.4 4.0 - 11.0 10e3/uL    RBC Count 5.00 4.40 - 5.90 10e6/uL    Hemoglobin 14.7 13.3 - 17.7 g/dL    Hematocrit 44.5 40.0 - 53.0 %    MCV 89 78 - 100 fL    MCH 29.4 26.5 - 33.0 pg    MCHC 33.0 31.5 - 36.5 g/dL    RDW 14.4 10.0 - 15.0 %    Platelet Count 201 150 - 450 10e3/uL    % Neutrophils 57 %    % Lymphocytes 37 %    % Monocytes 4 %    % Eosinophils 1 %    % Basophils 1 %    % Immature Granulocytes 0 %    NRBCs per 100 WBC 0 <1  /100    Absolute Neutrophils 3.7 1.6 - 8.3 10e3/uL    Absolute Lymphocytes 2.3 0.8 - 5.3 10e3/uL    Absolute Monocytes 0.3 0.0 - 1.3 10e3/uL    Absolute Eosinophils 0.0 0.0 - 0.7 10e3/uL    Absolute Basophils 0.0 0.0 - 0.2 10e3/uL    Absolute Immature Granulocytes 0.0 <=0.4 10e3/uL    Absolute NRBCs 0.0 10e3/uL       RADIOLOGY:  Reviewed all pertinent imaging. Please see official radiology report.  CT Abdomen Pelvis w/o Contrast   Final Result   IMPRESSION:    1.  No renal calculi. No hydronephrosis.      2.  Nonspecific bilateral perinephric stranding.      3.  Nonspecific gallbladder wall edema.                 I, Germain Hyde, am serving as a scribe to document services personally performed by Dr. Benita Wetzel based on my observation and the provider's statements to me. I, Benita Wetzel MD attest that Germain Hyde is acting in a scribe capacity, has observed my performance of the services and has documented them in accordance with my direction.         Benita Wetzel MD  02/19/22 [id number]

## 2022-02-19 NOTE — ED NOTES
Patient discharged to home. Discharge instructions reviewed and signed by the patient. All personal belongings removed from the room.  Dameon Elizondo RN  2/19/2022  12:03 PM

## 2023-01-30 ENCOUNTER — LAB REQUISITION (OUTPATIENT)
Dept: LAB | Facility: CLINIC | Age: 38
End: 2023-01-30
Payer: MEDICAID

## 2023-01-30 DIAGNOSIS — R13.19 OTHER DYSPHAGIA: ICD-10-CM

## 2023-01-30 DIAGNOSIS — I50.42 CHRONIC COMBINED SYSTOLIC (CONGESTIVE) AND DIASTOLIC (CONGESTIVE) HEART FAILURE (H): ICD-10-CM

## 2023-01-31 LAB
ANION GAP SERPL CALCULATED.3IONS-SCNC: 10 MMOL/L (ref 7–15)
BASOPHILS # BLD MANUAL: 0.1 10E3/UL (ref 0–0.2)
BASOPHILS NFR BLD MANUAL: 2 %
BUN SERPL-MCNC: 18.1 MG/DL (ref 6–20)
CALCIUM SERPL-MCNC: 9.8 MG/DL (ref 8.6–10)
CHLORIDE SERPL-SCNC: 99 MMOL/L (ref 98–107)
CREAT SERPL-MCNC: 0.55 MG/DL (ref 0.67–1.17)
DEPRECATED HCO3 PLAS-SCNC: 27 MMOL/L (ref 22–29)
EOSINOPHIL # BLD MANUAL: 0.2 10E3/UL (ref 0–0.7)
EOSINOPHIL NFR BLD MANUAL: 4 %
ERYTHROCYTE [DISTWIDTH] IN BLOOD BY AUTOMATED COUNT: 14.9 % (ref 10–15)
GFR SERPL CREATININE-BSD FRML MDRD: >90 ML/MIN/1.73M2
GLUCOSE SERPL-MCNC: 106 MG/DL (ref 70–99)
HCT VFR BLD AUTO: 39.1 % (ref 40–53)
HGB BLD-MCNC: 12.3 G/DL (ref 13.3–17.7)
LYMPHOCYTES # BLD MANUAL: 1.8 10E3/UL (ref 0.8–5.3)
LYMPHOCYTES NFR BLD MANUAL: 32 %
MCH RBC QN AUTO: 30.3 PG (ref 26.5–33)
MCHC RBC AUTO-ENTMCNC: 31.5 G/DL (ref 31.5–36.5)
MCV RBC AUTO: 96 FL (ref 78–100)
MONOCYTES # BLD MANUAL: 0.4 10E3/UL (ref 0–1.3)
MONOCYTES NFR BLD MANUAL: 7 %
NEUTROPHILS # BLD MANUAL: 3 10E3/UL (ref 1.6–8.3)
NEUTROPHILS NFR BLD MANUAL: 55 %
PLAT MORPH BLD: NORMAL
PLATELET # BLD AUTO: 207 10E3/UL (ref 150–450)
POTASSIUM SERPL-SCNC: 4.4 MMOL/L (ref 3.4–5.3)
RBC # BLD AUTO: 4.06 10E6/UL (ref 4.4–5.9)
RBC MORPH BLD: NORMAL
SODIUM SERPL-SCNC: 136 MMOL/L (ref 136–145)
WBC # BLD AUTO: 5.5 10E3/UL (ref 4–11)

## 2023-01-31 PROCEDURE — 85027 COMPLETE CBC AUTOMATED: CPT | Mod: ORL | Performed by: FAMILY MEDICINE

## 2023-01-31 PROCEDURE — 85007 BL SMEAR W/DIFF WBC COUNT: CPT | Mod: ORL | Performed by: FAMILY MEDICINE

## 2023-01-31 PROCEDURE — 36415 COLL VENOUS BLD VENIPUNCTURE: CPT | Mod: ORL | Performed by: FAMILY MEDICINE

## 2023-01-31 PROCEDURE — 80048 BASIC METABOLIC PNL TOTAL CA: CPT | Mod: ORL | Performed by: FAMILY MEDICINE

## 2023-01-31 PROCEDURE — P9604 ONE-WAY ALLOW PRORATED TRIP: HCPCS | Mod: ORL | Performed by: FAMILY MEDICINE

## 2023-05-25 ENCOUNTER — LAB REQUISITION (OUTPATIENT)
Dept: LAB | Facility: CLINIC | Age: 38
End: 2023-05-25
Payer: COMMERCIAL

## 2023-05-25 DIAGNOSIS — I63.9 CEREBRAL INFARCTION, UNSPECIFIED (H): ICD-10-CM

## 2023-05-25 DIAGNOSIS — N18.2 CHRONIC KIDNEY DISEASE, STAGE 2 (MILD): ICD-10-CM

## 2023-05-25 DIAGNOSIS — I50.22 CHRONIC SYSTOLIC (CONGESTIVE) HEART FAILURE (H): ICD-10-CM

## 2023-05-26 LAB
ANION GAP SERPL CALCULATED.3IONS-SCNC: 15 MMOL/L (ref 7–15)
BUN SERPL-MCNC: 11.2 MG/DL (ref 6–20)
CALCIUM SERPL-MCNC: 10.2 MG/DL (ref 8.6–10)
CHLORIDE SERPL-SCNC: 100 MMOL/L (ref 98–107)
CREAT SERPL-MCNC: 0.7 MG/DL (ref 0.67–1.17)
DEPRECATED HCO3 PLAS-SCNC: 24 MMOL/L (ref 22–29)
GFR SERPL CREATININE-BSD FRML MDRD: >90 ML/MIN/1.73M2
GLUCOSE SERPL-MCNC: 116 MG/DL (ref 70–99)
MAGNESIUM SERPL-MCNC: 1.7 MG/DL (ref 1.7–2.3)
NT-PROBNP SERPL-MCNC: 693 PG/ML (ref 0–450)
POTASSIUM SERPL-SCNC: 3.6 MMOL/L (ref 3.4–5.3)
SODIUM SERPL-SCNC: 139 MMOL/L (ref 136–145)

## 2023-05-26 PROCEDURE — 83735 ASSAY OF MAGNESIUM: CPT | Mod: ORL | Performed by: NURSE PRACTITIONER

## 2023-05-26 PROCEDURE — 36415 COLL VENOUS BLD VENIPUNCTURE: CPT | Mod: ORL | Performed by: NURSE PRACTITIONER

## 2023-05-26 PROCEDURE — 83880 ASSAY OF NATRIURETIC PEPTIDE: CPT | Mod: ORL | Performed by: NURSE PRACTITIONER

## 2023-05-26 PROCEDURE — 80048 BASIC METABOLIC PNL TOTAL CA: CPT | Mod: ORL | Performed by: NURSE PRACTITIONER

## 2023-05-26 PROCEDURE — P9603 ONE-WAY ALLOW PRORATED MILES: HCPCS | Mod: ORL | Performed by: NURSE PRACTITIONER

## 2023-06-01 ENCOUNTER — LAB REQUISITION (OUTPATIENT)
Dept: LAB | Facility: CLINIC | Age: 38
End: 2023-06-01
Payer: COMMERCIAL

## 2023-06-01 DIAGNOSIS — Z79.84 LONG TERM (CURRENT) USE OF ORAL HYPOGLYCEMIC DRUGS: ICD-10-CM

## 2023-06-02 LAB
ANION GAP SERPL CALCULATED.3IONS-SCNC: 10 MMOL/L (ref 7–15)
BUN SERPL-MCNC: 18.4 MG/DL (ref 6–20)
CALCIUM SERPL-MCNC: 9.4 MG/DL (ref 8.6–10)
CHLORIDE SERPL-SCNC: 107 MMOL/L (ref 98–107)
CREAT SERPL-MCNC: 0.91 MG/DL (ref 0.67–1.17)
DEPRECATED HCO3 PLAS-SCNC: 26 MMOL/L (ref 22–29)
GFR SERPL CREATININE-BSD FRML MDRD: >90 ML/MIN/1.73M2
GLUCOSE SERPL-MCNC: 95 MG/DL (ref 70–99)
POTASSIUM SERPL-SCNC: 4 MMOL/L (ref 3.4–5.3)
SODIUM SERPL-SCNC: 143 MMOL/L (ref 136–145)

## 2023-06-02 PROCEDURE — P9604 ONE-WAY ALLOW PRORATED TRIP: HCPCS | Mod: ORL | Performed by: FAMILY MEDICINE

## 2023-06-02 PROCEDURE — 80048 BASIC METABOLIC PNL TOTAL CA: CPT | Mod: ORL | Performed by: FAMILY MEDICINE

## 2023-06-02 PROCEDURE — 36415 COLL VENOUS BLD VENIPUNCTURE: CPT | Mod: ORL | Performed by: FAMILY MEDICINE

## 2023-06-22 ENCOUNTER — LAB REQUISITION (OUTPATIENT)
Dept: LAB | Facility: CLINIC | Age: 38
End: 2023-06-22
Payer: COMMERCIAL

## 2023-06-22 DIAGNOSIS — R13.19 OTHER DYSPHAGIA: ICD-10-CM

## 2023-07-03 LAB
ANION GAP SERPL CALCULATED.3IONS-SCNC: 9 MMOL/L (ref 7–15)
BUN SERPL-MCNC: 17.2 MG/DL (ref 6–20)
CALCIUM SERPL-MCNC: 9.5 MG/DL (ref 8.6–10)
CHLORIDE SERPL-SCNC: 106 MMOL/L (ref 98–107)
CREAT SERPL-MCNC: 0.81 MG/DL (ref 0.67–1.17)
DEPRECATED HCO3 PLAS-SCNC: 25 MMOL/L (ref 22–29)
GFR SERPL CREATININE-BSD FRML MDRD: >90 ML/MIN/1.73M2
GLUCOSE SERPL-MCNC: 80 MG/DL (ref 70–99)
POTASSIUM SERPL-SCNC: 3.7 MMOL/L (ref 3.4–5.3)
SODIUM SERPL-SCNC: 140 MMOL/L (ref 136–145)

## 2023-07-03 PROCEDURE — 36415 COLL VENOUS BLD VENIPUNCTURE: CPT | Mod: ORL | Performed by: NURSE PRACTITIONER

## 2023-07-03 PROCEDURE — P9604 ONE-WAY ALLOW PRORATED TRIP: HCPCS | Mod: ORL | Performed by: NURSE PRACTITIONER

## 2023-07-03 PROCEDURE — 80048 BASIC METABOLIC PNL TOTAL CA: CPT | Mod: ORL | Performed by: NURSE PRACTITIONER

## 2023-07-12 ENCOUNTER — LAB REQUISITION (OUTPATIENT)
Dept: LAB | Facility: CLINIC | Age: 38
End: 2023-07-12
Payer: COMMERCIAL

## 2023-07-12 DIAGNOSIS — I10 ESSENTIAL (PRIMARY) HYPERTENSION: ICD-10-CM

## 2023-07-13 LAB
ANION GAP SERPL CALCULATED.3IONS-SCNC: 9 MMOL/L (ref 7–15)
BUN SERPL-MCNC: 23.9 MG/DL (ref 6–20)
CALCIUM SERPL-MCNC: 9.4 MG/DL (ref 8.6–10)
CHLORIDE SERPL-SCNC: 105 MMOL/L (ref 98–107)
CREAT SERPL-MCNC: 0.93 MG/DL (ref 0.67–1.17)
DEPRECATED HCO3 PLAS-SCNC: 26 MMOL/L (ref 22–29)
GFR SERPL CREATININE-BSD FRML MDRD: >90 ML/MIN/1.73M2
GLUCOSE SERPL-MCNC: 86 MG/DL (ref 70–99)
POTASSIUM SERPL-SCNC: 3.9 MMOL/L (ref 3.4–5.3)
SODIUM SERPL-SCNC: 140 MMOL/L (ref 136–145)

## 2023-07-13 PROCEDURE — P9604 ONE-WAY ALLOW PRORATED TRIP: HCPCS | Mod: ORL | Performed by: FAMILY MEDICINE

## 2023-07-13 PROCEDURE — 80048 BASIC METABOLIC PNL TOTAL CA: CPT | Mod: ORL | Performed by: FAMILY MEDICINE

## 2023-07-13 PROCEDURE — 36415 COLL VENOUS BLD VENIPUNCTURE: CPT | Mod: ORL | Performed by: FAMILY MEDICINE

## 2023-08-02 NOTE — ED NOTES
Patient returned from CT and c/o itchy throat and lips. Provider notified.  
Report given to Ander joel [address]north   
Report given to JULIET Odell.  
Plate Method for Diabetes, Type 2 Diabetes Nutrition Therapy

## 2023-08-15 ENCOUNTER — LAB REQUISITION (OUTPATIENT)
Dept: LAB | Facility: CLINIC | Age: 38
End: 2023-08-15
Payer: COMMERCIAL

## 2023-08-15 DIAGNOSIS — I42.8 OTHER CARDIOMYOPATHIES (H): ICD-10-CM

## 2023-08-16 LAB
ANION GAP SERPL CALCULATED.3IONS-SCNC: 11 MMOL/L (ref 7–15)
BUN SERPL-MCNC: 28.5 MG/DL (ref 6–20)
CALCIUM SERPL-MCNC: 9.1 MG/DL (ref 8.6–10)
CHLORIDE SERPL-SCNC: 108 MMOL/L (ref 98–107)
CREAT SERPL-MCNC: 0.74 MG/DL (ref 0.67–1.17)
DEPRECATED HCO3 PLAS-SCNC: 22 MMOL/L (ref 22–29)
ERYTHROCYTE [DISTWIDTH] IN BLOOD BY AUTOMATED COUNT: 16.8 % (ref 10–15)
GFR SERPL CREATININE-BSD FRML MDRD: >90 ML/MIN/1.73M2
GLUCOSE SERPL-MCNC: 78 MG/DL (ref 70–99)
HCT VFR BLD AUTO: 39.4 % (ref 40–53)
HGB BLD-MCNC: 12.3 G/DL (ref 13.3–17.7)
MCH RBC QN AUTO: 28.9 PG (ref 26.5–33)
MCHC RBC AUTO-ENTMCNC: 31.2 G/DL (ref 31.5–36.5)
MCV RBC AUTO: 93 FL (ref 78–100)
PLATELET # BLD AUTO: 151 10E3/UL (ref 150–450)
POTASSIUM SERPL-SCNC: 4.3 MMOL/L (ref 3.4–5.3)
RBC # BLD AUTO: 4.25 10E6/UL (ref 4.4–5.9)
SODIUM SERPL-SCNC: 141 MMOL/L (ref 136–145)
WBC # BLD AUTO: 4.3 10E3/UL (ref 4–11)

## 2023-08-16 PROCEDURE — 80048 BASIC METABOLIC PNL TOTAL CA: CPT | Mod: ORL | Performed by: NURSE PRACTITIONER

## 2023-08-16 PROCEDURE — 36415 COLL VENOUS BLD VENIPUNCTURE: CPT | Mod: ORL | Performed by: NURSE PRACTITIONER

## 2023-08-16 PROCEDURE — P9604 ONE-WAY ALLOW PRORATED TRIP: HCPCS | Mod: ORL | Performed by: NURSE PRACTITIONER

## 2023-08-16 PROCEDURE — 85027 COMPLETE CBC AUTOMATED: CPT | Mod: ORL | Performed by: NURSE PRACTITIONER

## 2023-10-02 ENCOUNTER — LAB REQUISITION (OUTPATIENT)
Dept: LAB | Facility: CLINIC | Age: 38
End: 2023-10-02
Payer: COMMERCIAL

## 2023-10-02 DIAGNOSIS — I50.22 CHRONIC SYSTOLIC (CONGESTIVE) HEART FAILURE (H): ICD-10-CM

## 2023-10-03 LAB
ANION GAP SERPL CALCULATED.3IONS-SCNC: 11 MMOL/L (ref 7–15)
BUN SERPL-MCNC: 19.9 MG/DL (ref 6–20)
CALCIUM SERPL-MCNC: 9.7 MG/DL (ref 8.6–10)
CHLORIDE SERPL-SCNC: 105 MMOL/L (ref 98–107)
CREAT SERPL-MCNC: 0.85 MG/DL (ref 0.67–1.17)
DEPRECATED HCO3 PLAS-SCNC: 25 MMOL/L (ref 22–29)
EGFRCR SERPLBLD CKD-EPI 2021: >90 ML/MIN/1.73M2
GLUCOSE SERPL-MCNC: 81 MG/DL (ref 70–99)
POTASSIUM SERPL-SCNC: 3.8 MMOL/L (ref 3.4–5.3)
SODIUM SERPL-SCNC: 141 MMOL/L (ref 135–145)

## 2023-10-03 PROCEDURE — P9604 ONE-WAY ALLOW PRORATED TRIP: HCPCS | Mod: ORL | Performed by: FAMILY MEDICINE

## 2023-10-03 PROCEDURE — 36415 COLL VENOUS BLD VENIPUNCTURE: CPT | Mod: ORL | Performed by: FAMILY MEDICINE

## 2023-10-03 PROCEDURE — 80048 BASIC METABOLIC PNL TOTAL CA: CPT | Mod: ORL | Performed by: FAMILY MEDICINE

## 2024-01-21 ENCOUNTER — LAB REQUISITION (OUTPATIENT)
Dept: LAB | Facility: CLINIC | Age: 39
End: 2024-01-21
Payer: COMMERCIAL

## 2024-01-21 DIAGNOSIS — N18.2 CHRONIC KIDNEY DISEASE, STAGE 2 (MILD): ICD-10-CM

## 2024-01-22 LAB
ANION GAP SERPL CALCULATED.3IONS-SCNC: 11 MMOL/L (ref 7–15)
BUN SERPL-MCNC: 22.5 MG/DL (ref 6–20)
CALCIUM SERPL-MCNC: 9.4 MG/DL (ref 8.6–10)
CHLORIDE SERPL-SCNC: 107 MMOL/L (ref 98–107)
CREAT SERPL-MCNC: 0.89 MG/DL (ref 0.67–1.17)
DEPRECATED HCO3 PLAS-SCNC: 26 MMOL/L (ref 22–29)
EGFRCR SERPLBLD CKD-EPI 2021: >90 ML/MIN/1.73M2
GLUCOSE SERPL-MCNC: 114 MG/DL (ref 70–99)
NT-PROBNP SERPL-MCNC: 72 PG/ML (ref 0–450)
POTASSIUM SERPL-SCNC: 3.9 MMOL/L (ref 3.4–5.3)
SODIUM SERPL-SCNC: 144 MMOL/L (ref 135–145)

## 2024-01-22 PROCEDURE — 36415 COLL VENOUS BLD VENIPUNCTURE: CPT | Mod: ORL | Performed by: FAMILY MEDICINE

## 2024-01-22 PROCEDURE — 83880 ASSAY OF NATRIURETIC PEPTIDE: CPT | Mod: ORL | Performed by: FAMILY MEDICINE

## 2024-01-22 PROCEDURE — 80048 BASIC METABOLIC PNL TOTAL CA: CPT | Mod: ORL | Performed by: FAMILY MEDICINE

## 2024-01-22 PROCEDURE — P9604 ONE-WAY ALLOW PRORATED TRIP: HCPCS | Mod: ORL | Performed by: FAMILY MEDICINE

## 2024-02-17 ENCOUNTER — LAB REQUISITION (OUTPATIENT)
Dept: LAB | Facility: CLINIC | Age: 39
End: 2024-02-17
Payer: MEDICAID

## 2024-02-17 DIAGNOSIS — Z29.9 ENCOUNTER FOR PROPHYLACTIC MEASURES, UNSPECIFIED: ICD-10-CM

## 2024-02-19 LAB
ANION GAP SERPL CALCULATED.3IONS-SCNC: 12 MMOL/L (ref 7–15)
BUN SERPL-MCNC: 14.7 MG/DL (ref 6–20)
CALCIUM SERPL-MCNC: 9.5 MG/DL (ref 8.6–10)
CHLORIDE SERPL-SCNC: 102 MMOL/L (ref 98–107)
CREAT SERPL-MCNC: 0.82 MG/DL (ref 0.67–1.17)
DEPRECATED HCO3 PLAS-SCNC: 25 MMOL/L (ref 22–29)
EGFRCR SERPLBLD CKD-EPI 2021: >90 ML/MIN/1.73M2
GLUCOSE SERPL-MCNC: 73 MG/DL (ref 70–99)
POTASSIUM SERPL-SCNC: 4.1 MMOL/L (ref 3.4–5.3)
SODIUM SERPL-SCNC: 139 MMOL/L (ref 135–145)

## 2024-02-19 PROCEDURE — 36415 COLL VENOUS BLD VENIPUNCTURE: CPT | Mod: ORL | Performed by: NURSE PRACTITIONER

## 2024-02-19 PROCEDURE — 80048 BASIC METABOLIC PNL TOTAL CA: CPT | Mod: ORL | Performed by: NURSE PRACTITIONER

## 2024-02-19 PROCEDURE — P9604 ONE-WAY ALLOW PRORATED TRIP: HCPCS | Mod: ORL | Performed by: NURSE PRACTITIONER

## 2024-02-24 ENCOUNTER — LAB REQUISITION (OUTPATIENT)
Dept: LAB | Facility: CLINIC | Age: 39
End: 2024-02-24

## 2024-02-24 DIAGNOSIS — N18.2 CHRONIC KIDNEY DISEASE, STAGE 2 (MILD): ICD-10-CM

## 2024-02-26 ENCOUNTER — LAB REQUISITION (OUTPATIENT)
Dept: LAB | Facility: CLINIC | Age: 39
End: 2024-02-26
Payer: MEDICAID

## 2024-02-26 DIAGNOSIS — N18.2 CHRONIC KIDNEY DISEASE, STAGE 2 (MILD): ICD-10-CM

## 2024-02-27 LAB
ANION GAP SERPL CALCULATED.3IONS-SCNC: 8 MMOL/L (ref 7–15)
BUN SERPL-MCNC: 18.9 MG/DL (ref 6–20)
CALCIUM SERPL-MCNC: 9.6 MG/DL (ref 8.6–10)
CHLORIDE SERPL-SCNC: 106 MMOL/L (ref 98–107)
CREAT SERPL-MCNC: 0.9 MG/DL (ref 0.67–1.17)
DEPRECATED HCO3 PLAS-SCNC: 25 MMOL/L (ref 22–29)
EGFRCR SERPLBLD CKD-EPI 2021: >90 ML/MIN/1.73M2
GLUCOSE SERPL-MCNC: 92 MG/DL (ref 70–99)
NT-PROBNP SERPL-MCNC: 181 PG/ML (ref 0–450)
POTASSIUM SERPL-SCNC: 5.1 MMOL/L (ref 3.4–5.3)
SODIUM SERPL-SCNC: 139 MMOL/L (ref 135–145)

## 2024-02-27 PROCEDURE — P9604 ONE-WAY ALLOW PRORATED TRIP: HCPCS | Mod: ORL | Performed by: INTERNAL MEDICINE

## 2024-02-27 PROCEDURE — 83880 ASSAY OF NATRIURETIC PEPTIDE: CPT | Mod: ORL | Performed by: INTERNAL MEDICINE

## 2024-02-27 PROCEDURE — 36415 COLL VENOUS BLD VENIPUNCTURE: CPT | Mod: ORL | Performed by: INTERNAL MEDICINE

## 2024-02-27 PROCEDURE — 80048 BASIC METABOLIC PNL TOTAL CA: CPT | Mod: ORL | Performed by: INTERNAL MEDICINE

## 2024-03-12 ENCOUNTER — LAB REQUISITION (OUTPATIENT)
Dept: LAB | Facility: CLINIC | Age: 39
End: 2024-03-12

## 2024-03-12 DIAGNOSIS — I50.22 CHRONIC SYSTOLIC (CONGESTIVE) HEART FAILURE (H): ICD-10-CM

## 2024-06-06 ENCOUNTER — LAB REQUISITION (OUTPATIENT)
Dept: LAB | Facility: CLINIC | Age: 39
End: 2024-06-06
Payer: COMMERCIAL

## 2024-06-06 DIAGNOSIS — N18.9 CHRONIC KIDNEY DISEASE, UNSPECIFIED: ICD-10-CM

## 2024-06-06 DIAGNOSIS — E78.5 HYPERLIPIDEMIA, UNSPECIFIED: ICD-10-CM

## 2024-06-07 LAB
ANION GAP SERPL CALCULATED.3IONS-SCNC: 12 MMOL/L (ref 7–15)
BUN SERPL-MCNC: 22.5 MG/DL (ref 6–20)
CALCIUM SERPL-MCNC: 9.9 MG/DL (ref 8.6–10)
CHLORIDE SERPL-SCNC: 102 MMOL/L (ref 98–107)
CHOLEST SERPL-MCNC: 145 MG/DL
CREAT SERPL-MCNC: 0.98 MG/DL (ref 0.67–1.17)
DEPRECATED HCO3 PLAS-SCNC: 26 MMOL/L (ref 22–29)
EGFRCR SERPLBLD CKD-EPI 2021: >90 ML/MIN/1.73M2
FASTING STATUS PATIENT QL REPORTED: NORMAL
GLUCOSE SERPL-MCNC: 127 MG/DL (ref 70–99)
HDLC SERPL-MCNC: 48 MG/DL
LDLC SERPL CALC-MCNC: 84 MG/DL
NONHDLC SERPL-MCNC: 97 MG/DL
POTASSIUM SERPL-SCNC: 4.5 MMOL/L (ref 3.4–5.3)
SODIUM SERPL-SCNC: 140 MMOL/L (ref 135–145)
TRIGL SERPL-MCNC: 67 MG/DL

## 2024-06-07 PROCEDURE — P9604 ONE-WAY ALLOW PRORATED TRIP: HCPCS | Mod: ORL

## 2024-06-07 PROCEDURE — 80061 LIPID PANEL: CPT | Mod: ORL

## 2024-06-07 PROCEDURE — 36415 COLL VENOUS BLD VENIPUNCTURE: CPT | Mod: ORL

## 2024-06-07 PROCEDURE — 80048 BASIC METABOLIC PNL TOTAL CA: CPT | Mod: ORL

## 2025-01-08 ENCOUNTER — LAB REQUISITION (OUTPATIENT)
Dept: LAB | Facility: CLINIC | Age: 40
End: 2025-01-08
Payer: COMMERCIAL

## 2025-01-08 DIAGNOSIS — E11.9 TYPE 2 DIABETES MELLITUS WITHOUT COMPLICATIONS (H): ICD-10-CM

## 2025-01-08 DIAGNOSIS — E53.8 DEFICIENCY OF OTHER SPECIFIED B GROUP VITAMINS: ICD-10-CM

## 2025-01-08 DIAGNOSIS — E55.9 VITAMIN D DEFICIENCY, UNSPECIFIED: ICD-10-CM

## 2025-01-08 DIAGNOSIS — I10 ESSENTIAL (PRIMARY) HYPERTENSION: ICD-10-CM

## 2025-01-08 DIAGNOSIS — E07.9 DISORDER OF THYROID, UNSPECIFIED: ICD-10-CM

## 2025-01-08 DIAGNOSIS — E78.5 HYPERLIPIDEMIA, UNSPECIFIED: ICD-10-CM

## 2025-01-16 LAB
ALBUMIN SERPL BCG-MCNC: 4.3 G/DL (ref 3.5–5.2)
ALP SERPL-CCNC: 64 U/L (ref 40–150)
ALT SERPL W P-5'-P-CCNC: 14 U/L (ref 0–70)
ANION GAP SERPL CALCULATED.3IONS-SCNC: 11 MMOL/L (ref 7–15)
AST SERPL W P-5'-P-CCNC: 22 U/L (ref 0–45)
BILIRUB SERPL-MCNC: 0.4 MG/DL
BUN SERPL-MCNC: 20.9 MG/DL (ref 6–20)
CALCIUM SERPL-MCNC: 9.8 MG/DL (ref 8.8–10.4)
CHLORIDE SERPL-SCNC: 103 MMOL/L (ref 98–107)
CHOLEST SERPL-MCNC: 145 MG/DL
CREAT SERPL-MCNC: 1.04 MG/DL (ref 0.67–1.17)
EGFRCR SERPLBLD CKD-EPI 2021: >90 ML/MIN/1.73M2
ERYTHROCYTE [DISTWIDTH] IN BLOOD BY AUTOMATED COUNT: 14.2 % (ref 10–15)
EST. AVERAGE GLUCOSE BLD GHB EST-MCNC: 128 MG/DL
FASTING STATUS PATIENT QL REPORTED: NORMAL
FOLATE SERPL-MCNC: 17.3 NG/ML (ref 4.6–34.8)
GLUCOSE SERPL-MCNC: 83 MG/DL (ref 70–99)
HBA1C MFR BLD: 6.1 %
HCO3 SERPL-SCNC: 28 MMOL/L (ref 22–29)
HCT VFR BLD AUTO: 40.6 % (ref 40–53)
HDLC SERPL-MCNC: 47 MG/DL
HGB BLD-MCNC: 13.1 G/DL (ref 13.3–17.7)
LDLC SERPL CALC-MCNC: 86 MG/DL
MCH RBC QN AUTO: 29.6 PG (ref 26.5–33)
MCHC RBC AUTO-ENTMCNC: 32.3 G/DL (ref 31.5–36.5)
MCV RBC AUTO: 92 FL (ref 78–100)
NONHDLC SERPL-MCNC: 98 MG/DL
PLATELET # BLD AUTO: 156 10E3/UL (ref 150–450)
POTASSIUM SERPL-SCNC: 3.8 MMOL/L (ref 3.4–5.3)
PROT SERPL-MCNC: 7.6 G/DL (ref 6.4–8.3)
RBC # BLD AUTO: 4.42 10E6/UL (ref 4.4–5.9)
SODIUM SERPL-SCNC: 142 MMOL/L (ref 135–145)
TRIGL SERPL-MCNC: 59 MG/DL
TSH SERPL DL<=0.005 MIU/L-ACNC: 1.06 UIU/ML (ref 0.3–4.2)
VIT B12 SERPL-MCNC: 509 PG/ML (ref 232–1245)
WBC # BLD AUTO: 3.3 10E3/UL (ref 4–11)

## 2025-01-16 PROCEDURE — P9603 ONE-WAY ALLOW PRORATED MILES: HCPCS | Mod: ORL | Performed by: FAMILY MEDICINE

## 2025-01-16 PROCEDURE — 85027 COMPLETE CBC AUTOMATED: CPT | Mod: ORL | Performed by: FAMILY MEDICINE

## 2025-01-16 PROCEDURE — 80053 COMPREHEN METABOLIC PANEL: CPT | Mod: ORL | Performed by: FAMILY MEDICINE

## 2025-01-16 PROCEDURE — 36415 COLL VENOUS BLD VENIPUNCTURE: CPT | Mod: ORL | Performed by: FAMILY MEDICINE

## 2025-01-16 PROCEDURE — 80061 LIPID PANEL: CPT | Mod: ORL | Performed by: FAMILY MEDICINE

## 2025-01-16 PROCEDURE — 82607 VITAMIN B-12: CPT | Mod: ORL | Performed by: FAMILY MEDICINE

## 2025-01-16 PROCEDURE — 84443 ASSAY THYROID STIM HORMONE: CPT | Mod: ORL | Performed by: FAMILY MEDICINE

## 2025-01-16 PROCEDURE — 83036 HEMOGLOBIN GLYCOSYLATED A1C: CPT | Mod: ORL | Performed by: FAMILY MEDICINE

## 2025-01-16 PROCEDURE — 82746 ASSAY OF FOLIC ACID SERUM: CPT | Mod: ORL | Performed by: FAMILY MEDICINE

## 2025-02-04 ENCOUNTER — LAB REQUISITION (OUTPATIENT)
Dept: LAB | Facility: CLINIC | Age: 40
End: 2025-02-04
Payer: COMMERCIAL

## 2025-02-04 DIAGNOSIS — D64.9 ANEMIA, UNSPECIFIED: ICD-10-CM

## 2025-02-06 ENCOUNTER — LAB REQUISITION (OUTPATIENT)
Dept: LAB | Facility: CLINIC | Age: 40
End: 2025-02-06
Payer: COMMERCIAL

## 2025-02-06 DIAGNOSIS — D64.9 ANEMIA, UNSPECIFIED: ICD-10-CM

## 2025-02-06 LAB
ERYTHROCYTE [DISTWIDTH] IN BLOOD BY AUTOMATED COUNT: 14.7 % (ref 10–15)
FERRITIN SERPL-MCNC: 98 NG/ML (ref 31–409)
HCT VFR BLD AUTO: 41.5 % (ref 40–53)
HGB BLD-MCNC: 12.8 G/DL (ref 13.3–17.7)
MCH RBC QN AUTO: 29 PG (ref 26.5–33)
MCHC RBC AUTO-ENTMCNC: 30.8 G/DL (ref 31.5–36.5)
MCV RBC AUTO: 94 FL (ref 78–100)
NT-PROBNP SERPL-MCNC: 72 PG/ML (ref 0–450)
PLATELET # BLD AUTO: 156 10E3/UL (ref 150–450)
RBC # BLD AUTO: 4.41 10E6/UL (ref 4.4–5.9)
WBC # BLD AUTO: 4.5 10E3/UL (ref 4–11)

## 2025-02-06 PROCEDURE — P9604 ONE-WAY ALLOW PRORATED TRIP: HCPCS | Mod: ORL | Performed by: FAMILY MEDICINE

## 2025-02-06 PROCEDURE — 82728 ASSAY OF FERRITIN: CPT | Mod: ORL | Performed by: FAMILY MEDICINE

## 2025-02-06 PROCEDURE — 36415 COLL VENOUS BLD VENIPUNCTURE: CPT | Mod: ORL | Performed by: FAMILY MEDICINE

## 2025-02-06 PROCEDURE — 85027 COMPLETE CBC AUTOMATED: CPT | Mod: ORL | Performed by: FAMILY MEDICINE

## 2025-02-06 PROCEDURE — 83880 ASSAY OF NATRIURETIC PEPTIDE: CPT | Mod: ORL | Performed by: FAMILY MEDICINE
